# Patient Record
Sex: FEMALE | Race: WHITE | NOT HISPANIC OR LATINO | Employment: FULL TIME | URBAN - METROPOLITAN AREA
[De-identification: names, ages, dates, MRNs, and addresses within clinical notes are randomized per-mention and may not be internally consistent; named-entity substitution may affect disease eponyms.]

---

## 2024-07-10 ENCOUNTER — ULTRASOUND (OUTPATIENT)
Dept: OBGYN CLINIC | Facility: CLINIC | Age: 29
End: 2024-07-10
Payer: COMMERCIAL

## 2024-07-10 VITALS — DIASTOLIC BLOOD PRESSURE: 66 MMHG | WEIGHT: 127 LBS | SYSTOLIC BLOOD PRESSURE: 100 MMHG

## 2024-07-10 DIAGNOSIS — Z34.90 EARLY STAGE OF PREGNANCY: Primary | ICD-10-CM

## 2024-07-10 DIAGNOSIS — N91.2 AMENORRHEA: ICD-10-CM

## 2024-07-10 PROCEDURE — 76817 TRANSVAGINAL US OBSTETRIC: CPT | Performed by: STUDENT IN AN ORGANIZED HEALTH CARE EDUCATION/TRAINING PROGRAM

## 2024-07-10 PROCEDURE — 99203 OFFICE O/P NEW LOW 30 MIN: CPT | Performed by: STUDENT IN AN ORGANIZED HEALTH CARE EDUCATION/TRAINING PROGRAM

## 2024-07-10 NOTE — PROGRESS NOTES
Ambulatory Visit  Name: Deborah Way      : 1995      MRN: [id number]  Encounter Provider: Sangeetha Merchant MD  Encounter Date: 7/10/2024   Encounter department: [facility] [place]    Assessment & Plan   1. Early stage of pregnancy  -     Ambulatory Referral to Maternal Fetal Medicine; Future; Expected date: 2024  2. Amenorrhea  -     AMB US OB < 14 weeks single or first gestation level 1    Early pregnancy at 8 weeks 6 days with a calculated ERVIN of 2025 based on LMP c/w early ultrasound    - Genetic screening options reviewed. She is interested in NIPT, so MFM referral placed  - Prenatal care reviewed  - Pregnancy precautions reviewed    RTO for OB interview and PN-1 visit      History of Present Illness     Deborah Way is a 29 y.o. female who presents today for verification of pregnancy.  female, Patient's last menstrual period was 2024 (exact date). Menses are regular.This pregnancy was unplanned. She is accompanied by her  and child today.     OBHx is significant for FT  with baby in NICU for <48 hours for aspiration.  Taking a prenatal vitamin.     Review of Systems no vaginal bleeding      Objective     /66 (BP Location: Left arm, Patient Position: Sitting, Cuff Size: Standard)   Wt 57.6 kg (127 lb)   LMP 2024 (Exact Date)     Physical Exam  Vitals and nursing note reviewed.   Constitutional:       General: She is not in acute distress.     Appearance: She is well-developed.   HENT:      Head: Normocephalic and atraumatic.   Cardiovascular:      Rate and Rhythm: Normal rate.   Pulmonary:      Effort: Pulmonary effort is normal. No respiratory distress.   Genitourinary:     General: Normal vulva.      Cervix: No cervical motion tenderness or cervical bleeding.   Skin:     General: Skin is warm and dry.      Capillary Refill: Capillary refill takes less than 2 seconds.   Neurological:      Mental Status:  She is alert.   Psychiatric:         Mood and Affect: Mood normal.       Administrative Statements

## 2024-07-10 NOTE — PROGRESS NOTES
Pt is here for early ultrasound   No concerns at this time      LMP 24  Regular Cycles   8 weeks 6 days   ERVIN 25  [place] Pregnancy   No Vaginal Bleeding   No Nausea or Vomiting  Blood Type A +      Ultrasound Probe Disinfection    A transvaginal ultrasound was performed.   Prior to use, disinfection was performed with High Level Disinfection Process ([employer])  Probe serial number [id number]:  [id number] was used    LANE LAM  07/10/24  10:38 AM

## 2024-07-11 ENCOUNTER — TELEPHONE (OUTPATIENT)
Age: 29
End: 2024-07-11

## 2024-07-12 ENCOUNTER — TELEPHONE (OUTPATIENT)
Age: 29
End: 2024-07-12

## 2024-07-18 ENCOUNTER — OB ABSTRACT (OUTPATIENT)
Dept: OBGYN CLINIC | Facility: CLINIC | Age: 29
End: 2024-07-18

## 2024-07-22 ENCOUNTER — NURSE TRIAGE (OUTPATIENT)
Age: 29
End: 2024-07-22

## 2024-07-22 NOTE — TELEPHONE ENCOUNTER
"Pt reports fever, chills, cough since Thursday. All symptoms have resolved but pt reports that she has had constant chest pain since then. Pt currently having chest pain now 6/10. Pt denies SOB, nausea, vomiting, sweats, jaw pain, neck pain. She is currently 10 wks pregnant. Pain mostly located in the center of the chest and constant. Advised pt with constant chest pain this long it is best to get evaluated in the ED to rule out anything cardiac or PE. Pt is hesitant to go to ED as she has a large copay. Advised pt again of the concerns/danger or delaying evaluation. Pt states she will discuss with her .       Reason for Disposition   Chest pain lasting longer than 5 minutes and occurred in last 3 days (72 hours) (Exception: feels exactly the same as previously diagnosed heartburn and has accompanying sour taste in mouth)    Answer Assessment - Initial Assessment Questions  1. LOCATION: \"Where does it hurt?\"        Center chest area  2. RADIATION: \"Does the pain go anywhere else?\" (e.g., into neck, jaw, arms, back)      Denies  3. ONSET: \"When did the chest pain begin?\" (Minutes, hours or days)       Thursday   4. PATTERN \"Does the pain come and go, or has it been constant since it started?\"  \"Does it get worse with exertion?\"       Constant  5. DURATION: \"How long does it last\" (e.g., seconds, minutes, hours)      Constant  6. SEVERITY: \"How bad is the pain?\"  (e.g., Scale 1-10; mild, moderate, or severe)     - MILD (1-3): doesn't interfere with normal activities      - MODERATE (4-7): interferes with normal activities or awakens from sleep     - SEVERE (8-10): excruciating pain, unable to do any normal activities        6/1-  7. CARDIAC RISK FACTORS: \"Do you have any history of heart problems or risk factors for heart disease?\" (e.g., angina, prior heart attack; diabetes, high blood pressure, high cholesterol, smoker, or strong family history of heart disease)      Denies  8. PULMONARY RISK FACTORS: \"Do you " "have any history of lung disease?\"  (e.g., blood clots in lung, asthma, emphysema, birth control pills)      Denies  9. CAUSE: \"What do you think is causing the chest pain?\"      Unsure  10. OTHER SYMPTOMS: \"Do you have any other symptoms?\" (e.g., dizziness, nausea, vomiting, sweating, fever, difficulty breathing, cough)        Denies  11. PREGNANCY: \"Is there any chance you are pregnant?\" \"When was your last menstrual period?\"        10 weeks pregnant    Protocols used: Chest Pain-ADULT-[place]    "

## 2024-07-24 ENCOUNTER — OB ABSTRACT (OUTPATIENT)
Dept: OBGYN CLINIC | Facility: CLINIC | Age: 29
End: 2024-07-24

## 2024-07-24 NOTE — PATIENT INSTRUCTIONS
.Congratulations!! Please review our Pregnancy Essential Guide and [facility] L&D Virtual tour from our networks website.     [facility] Pregnancy Essentials Guide  [facility] ([email or web address])     [facility] ([facility])

## 2024-07-26 ENCOUNTER — INITIAL PRENATAL (OUTPATIENT)
Dept: OBGYN CLINIC | Facility: CLINIC | Age: 29
End: 2024-07-26

## 2024-07-26 VITALS — WEIGHT: 127 LBS | HEIGHT: 64 IN | BODY MASS INDEX: 21.68 KG/M2

## 2024-07-26 DIAGNOSIS — Z36.9 UNSPECIFIED ANTENATAL SCREENING: Primary | ICD-10-CM

## 2024-07-26 DIAGNOSIS — Z34.01 ENCOUNTER FOR SUPERVISION OF NORMAL FIRST PREGNANCY IN FIRST TRIMESTER: ICD-10-CM

## 2024-07-26 DIAGNOSIS — Z31.430 ENCOUNTER OF FEMALE FOR TESTING FOR GENETIC DISEASE CARRIER STATUS FOR PROCREATIVE MANAGEMENT: ICD-10-CM

## 2024-07-26 PROCEDURE — OBC

## 2024-07-26 RX ORDER — LANOLIN ALCOHOL/MO/W.PET/CERES
1 CREAM (GRAM) TOPICAL 2 TIMES DAILY
COMMUNITY

## 2024-07-26 NOTE — PROGRESS NOTES
.  OB INTAKE INTERVIEW  Patient is 29 y.o. who presents for OB intake at 11.1wks  She is accompanied by Spouse during this encounter  The father of her baby (Yonny) is involved in the pregnancy and is 34 years old.      Last Menstrual Period: 2024  Ultrasound: Measured 8 weeks 6 days on 07/10/2024  Estimated Date of Delivery: 2025 confirmed  by 8.6 week US    Signs/Symptoms of Pregnancy  Current pregnancy symptoms: fatigue  Constipation no  Headaches no  Cramping/spotting no  PICA cravings no    Diabetes-  Body mass index is 21.8 kg/m².  If patient has 1 or more, please order early 1 hour GTT  History of GDM no  BMI >35 no  History of PCOS or current metformin use no  History of LGA/macrosomic infant (4000g/9lbs) no    If patient has 2 or more, please order early 1 hour GTT  BMI>30 no  AMA no  First degree relative with type 2 diabetes no  History of chronic HTN, hyperlipidemia, elevated A1C no  High risk race (, , ,  or ) no    Hypertension- if you answer yes to any of the following, please order baseline preeclampsia labs (cbc, comprehensive metabolic panel, urine protein creatinine ratio, uric acid)  History of of chronic HTN no  History of gestational HTN no  History of preeclampsia, eclampsia, or HELLP syndrome no  History of diabetes no  History of lupus, autoimmune disease, kidney disease no    Thyroid- if yes order TSH with reflex T4  History of thyroid disease no    Bleeding Disorder or Hx of DVT-patient or first degree relative with history of. Order the following if not done previously.   (Factor V, antithrombin III, prothrombin gene mutation, protein C and S Ag, lupus anticoagulant, anticardiolipin, beta-2 glycoprotein)   no    OB/GYN-  History of abnormal pap smear no       Date of last pap smear   History of HPV no  History of Herpes/HSV no  History of other STI (gonorrhea, chlamydia, trich) no  History of prior   YES  History of prior  no  History of  delivery prior to 36 weeks 6 days no  History of blood transfusion no  Ok for blood transfusion yes    Substance screening-   History of tobacco use no  Currently using tobacco no  Substance Use Screen Level low    MRSA Screening-   Does the pt have a hx of MRSA? no    Immunizations:  Influenza vaccine given this season no  Discussed Tdap vaccine yes  Discussed COVID Vaccine yes    Genetic/M-  Do you or your partner have a history of any of the following in yourselves or first degree relatives?  Cystic fibrosis no  Spinal muscular atrophy no  Hemoglobinopathy/Sickle Cell/Thalassemia no  Fragile X Intellectual Disability no        discussed Carrier Screening being completed once in a lifetime as a standard of care lab test. Lab work ordered    Appointment for Nuchal Translucency Ultrasound at [facility] scheduled for 2024      Interview education  [facility] Pregnancy Essentials Book reviewed, discussed and attached to their AVS yes    Nurse/Family Partnership- patient may qualify no; referral placed no    Prenatal lab work scripts yes  Extra labs ordered:  HGB Electrophoresis    Aspirin/Preeclampsia Screen    Risk Level Risk Factor Recommendation   LOW Prior Uncomplicated full-term delivery YES No Aspirin recommendation        MODERATE Nulliparity no Recommend low-dose aspirin if     BMI>30 no 2 or more moderate risk factors    Family History Preeclampsia (mother/sister) no     35yr old or greater no     Black Race, Concern for SDOH/Low Socioeconomic no     IVF Pregnancy  no     Personal History Risks (low birth weight, prior adverse preg outcome, >10yr preg interval) no         HIGH History of Preeclampsia no Recommend low-dose aspirin if     Multifetal gestation no 1 or more high risk factors    Chronic HTN no     Type 1 or 2 Diabetes no     Renal Disease no     Autoimmune Disease  no      Contraindications to ASA therapy:  NSAID/ ASA allergy: no  Nasal  polyps: no  Asthma with history of ASA induced bronchospasm: no  Relative contraindications:  History of GI bleed: no  Active peptic ulcer disease: no  Severe hepatic dysfunction: no    Patient should be recommended to take ASA 162mg during this pregnancy from 12-36wks to lower her risk of preeclampsia: NONE      The patient has a history now or in prior pregnancy notable for:  Patient is currently breast feeding       Details that I feel the provider should be aware of: patient moved her from [place], has a 7 month old daughter    PN1 visit scheduled. The patient was oriented to our practice, the navigator role, reviewed delivering physicians and [facility] for Delivery. All questions were answered.    Interviewed by: Felicitas Macias Lpn, Ob Nurse Navigator

## 2024-08-07 ENCOUNTER — APPOINTMENT (OUTPATIENT)
Dept: LAB | Facility: CLINIC | Age: 29
End: 2024-08-07
Payer: COMMERCIAL

## 2024-08-07 DIAGNOSIS — Z31.430 ENCOUNTER OF FEMALE FOR TESTING FOR GENETIC DISEASE CARRIER STATUS FOR PROCREATIVE MANAGEMENT: ICD-10-CM

## 2024-08-07 DIAGNOSIS — Z36.9 UNSPECIFIED ANTENATAL SCREENING: ICD-10-CM

## 2024-08-07 DIAGNOSIS — Z34.01 ENCOUNTER FOR SUPERVISION OF NORMAL FIRST PREGNANCY IN FIRST TRIMESTER: ICD-10-CM

## 2024-08-07 LAB
ABO GROUP BLD: NORMAL
BACTERIA UR QL AUTO: ABNORMAL /HPF
BASOPHILS # BLD AUTO: 0.03 THOUSANDS/ÂΜL (ref 0–0.1)
BASOPHILS NFR BLD AUTO: 1 % (ref 0–1)
BILIRUB UR QL STRIP: NEGATIVE
BLD GP AB SCN SERPL QL: NEGATIVE
CLARITY UR: ABNORMAL
COLOR UR: ABNORMAL
EOSINOPHIL # BLD AUTO: 0.09 THOUSAND/ÂΜL (ref 0–0.61)
EOSINOPHIL NFR BLD AUTO: 2 % (ref 0–6)
ERYTHROCYTE [DISTWIDTH] IN BLOOD BY AUTOMATED COUNT: 13.1 % (ref 11.6–15.1)
GLUCOSE UR STRIP-MCNC: NEGATIVE MG/DL
HCT VFR BLD AUTO: 39.8 % (ref 34.8–46.1)
HGB BLD-MCNC: 13.3 G/DL (ref 11.5–15.4)
HGB UR QL STRIP.AUTO: NEGATIVE
IMM GRANULOCYTES # BLD AUTO: 0.02 THOUSAND/UL (ref 0–0.2)
IMM GRANULOCYTES NFR BLD AUTO: 0 % (ref 0–2)
KETONES UR STRIP-MCNC: NEGATIVE MG/DL
LEUKOCYTE ESTERASE UR QL STRIP: ABNORMAL
LYMPHOCYTES # BLD AUTO: 1.5 THOUSANDS/ÂΜL (ref 0.6–4.47)
LYMPHOCYTES NFR BLD AUTO: 25 % (ref 14–44)
MCH RBC QN AUTO: 30.2 PG (ref 26.8–34.3)
MCHC RBC AUTO-ENTMCNC: 33.4 G/DL (ref 31.4–37.4)
MCV RBC AUTO: 91 FL (ref 82–98)
MONOCYTES # BLD AUTO: 0.84 THOUSAND/ÂΜL (ref 0.17–1.22)
MONOCYTES NFR BLD AUTO: 14 % (ref 4–12)
MUCOUS THREADS UR QL AUTO: ABNORMAL
NEUTROPHILS # BLD AUTO: 3.52 THOUSANDS/ÂΜL (ref 1.85–7.62)
NEUTS SEG NFR BLD AUTO: 58 % (ref 43–75)
NITRITE UR QL STRIP: POSITIVE
NON-SQ EPI CELLS URNS QL MICRO: ABNORMAL /HPF
NRBC BLD AUTO-RTO: 0 /100 WBCS
PH UR STRIP.AUTO: 7 [PH]
PLATELET # BLD AUTO: 339 THOUSANDS/UL (ref 149–390)
PMV BLD AUTO: 10.2 FL (ref 8.9–12.7)
PROT UR STRIP-MCNC: NEGATIVE MG/DL
RBC # BLD AUTO: 4.4 MILLION/UL (ref 3.81–5.12)
RBC #/AREA URNS AUTO: ABNORMAL /HPF
RH BLD: POSITIVE
RUBV IGG SERPL IA-ACNC: 315.5 IU/ML
SP GR UR STRIP.AUTO: 1.01 (ref 1–1.03)
SPECIMEN EXPIRATION DATE: NORMAL
UROBILINOGEN UR STRIP-ACNC: <2 MG/DL
WBC # BLD AUTO: 6 THOUSAND/UL (ref 4.31–10.16)
WBC #/AREA URNS AUTO: ABNORMAL /HPF

## 2024-08-07 PROCEDURE — 87186 SC STD MICRODIL/AGAR DIL: CPT

## 2024-08-07 PROCEDURE — 87340 HEPATITIS B SURFACE AG IA: CPT

## 2024-08-07 PROCEDURE — 86762 RUBELLA ANTIBODY: CPT

## 2024-08-07 PROCEDURE — 86850 RBC ANTIBODY SCREEN: CPT

## 2024-08-07 PROCEDURE — 86706 HEP B SURFACE ANTIBODY: CPT

## 2024-08-07 PROCEDURE — 81001 URINALYSIS AUTO W/SCOPE: CPT

## 2024-08-07 PROCEDURE — 86901 BLOOD TYPING SEROLOGIC RH(D): CPT

## 2024-08-07 PROCEDURE — 86900 BLOOD TYPING SEROLOGIC ABO: CPT

## 2024-08-07 PROCEDURE — 86803 HEPATITIS C AB TEST: CPT

## 2024-08-07 PROCEDURE — 36415 COLL VENOUS BLD VENIPUNCTURE: CPT

## 2024-08-07 PROCEDURE — 85025 COMPLETE CBC W/AUTO DIFF WBC: CPT

## 2024-08-07 PROCEDURE — 87389 HIV-1 AG W/HIV-1&-2 AB AG IA: CPT

## 2024-08-07 PROCEDURE — 87077 CULTURE AEROBIC IDENTIFY: CPT

## 2024-08-07 PROCEDURE — 87086 URINE CULTURE/COLONY COUNT: CPT

## 2024-08-07 PROCEDURE — 83020 HEMOGLOBIN ELECTROPHORESIS: CPT

## 2024-08-07 PROCEDURE — 86780 TREPONEMA PALLIDUM: CPT

## 2024-08-08 DIAGNOSIS — O23.40 URINARY TRACT INFECTION IN MOTHER DURING PREGNANCY, ANTEPARTUM: Primary | ICD-10-CM

## 2024-08-08 LAB
HBV SURFACE AB SER-ACNC: 28.6 MIU/ML
HBV SURFACE AG SER QL: NORMAL
HCV AB SER QL: NORMAL
HIV 1+2 AB+HIV1 P24 AG SERPL QL IA: NORMAL
HIV 2 AB SERPL QL IA: NORMAL
HIV1 AB SERPL QL IA: NORMAL
HIV1 P24 AG SERPL QL IA: NORMAL
TREPONEMA PALLIDUM IGG+IGM AB [PRESENCE] IN SERUM OR PLASMA BY IMMUNOASSAY: NORMAL

## 2024-08-08 RX ORDER — NITROFURANTOIN 25; 75 MG/1; MG/1
100 CAPSULE ORAL 2 TIMES DAILY
Qty: 10 CAPSULE | Refills: 0 | Status: CANCELLED | OUTPATIENT
Start: 2024-08-08 | End: 2024-08-13

## 2024-08-09 ENCOUNTER — ROUTINE PRENATAL (OUTPATIENT)
Facility: HOSPITAL | Age: 29
End: 2024-08-09
Attending: STUDENT IN AN ORGANIZED HEALTH CARE EDUCATION/TRAINING PROGRAM
Payer: COMMERCIAL

## 2024-08-09 VITALS
WEIGHT: 124.56 LBS | SYSTOLIC BLOOD PRESSURE: 130 MMHG | DIASTOLIC BLOOD PRESSURE: 66 MMHG | BODY MASS INDEX: 21.27 KG/M2 | HEART RATE: 71 BPM | HEIGHT: 64 IN

## 2024-08-09 DIAGNOSIS — Z36.82 NUCHAL TRANSLUCENCY OF FETUS ON PRENATAL ULTRASOUND: ICD-10-CM

## 2024-08-09 DIAGNOSIS — Z34.90 EARLY STAGE OF PREGNANCY: ICD-10-CM

## 2024-08-09 DIAGNOSIS — Z36.0 ENCOUNTER FOR ANTENATAL SCREENING FOR CHROMOSOMAL ANOMALIES: Primary | ICD-10-CM

## 2024-08-09 DIAGNOSIS — Z3A.13 13 WEEKS GESTATION OF PREGNANCY: ICD-10-CM

## 2024-08-09 LAB
BACTERIA UR CULT: ABNORMAL
HGB A MFR BLD: 2.5 % (ref 1.8–3.2)
HGB A MFR BLD: 97.5 % (ref 96.4–98.8)
HGB F MFR BLD: 0 % (ref 0–2)
HGB FRACT BLD-IMP: NORMAL
HGB S MFR BLD: 0 %

## 2024-08-09 PROCEDURE — 76813 OB US NUCHAL MEAS 1 GEST: CPT | Performed by: OBSTETRICS & GYNECOLOGY

## 2024-08-09 PROCEDURE — 76801 OB US < 14 WKS SINGLE FETUS: CPT | Performed by: OBSTETRICS & GYNECOLOGY

## 2024-08-09 PROCEDURE — 36415 COLL VENOUS BLD VENIPUNCTURE: CPT | Performed by: OBSTETRICS & GYNECOLOGY

## 2024-08-09 PROCEDURE — 99243 OFF/OP CNSLTJ NEW/EST LOW 30: CPT | Performed by: OBSTETRICS & GYNECOLOGY

## 2024-08-09 NOTE — LETTER
August 12, 2024     Sangeetha Merchant MD  [address]  [address]  [place] [address]    Patient: Deborah Way   YOB: 1995   Date of Visit: 8/9/2024       Dear Dr. Merchant:    Thank you for referring Deborah Way to me for evaluation. Below are my notes for this consultation.    If you have questions, please do not hesitate to call me. I look forward to following your patient along with you.         Sincerely,        Helio Rowe MD        CC: No Recipients    Helio Rowe MD  8/12/2024  1:33 PM  Sign when Signing Visit  A fetal ultrasound was completed. See Ob procedures in Epic for an interpretation and recommendations. Do not hesitate to contact us in M if you have questions.    Helio Rowe MD, MSCE  Maternal Fetal Medicine      Juliana Reyes  8/12/2024  1:30 PM  Sign when Signing Visit  Patient chose to have [facility] [id number] Non-Invasive Prenatal Screen [id number] PLUS w/ SCA, WITH fetal sex.  Patient choose to be billed through insurance.     Patient given brochure and is aware [facility] will contact patient's insurance and coordinate coverage.  Provided [facility] contact information. General inquiries [phone], Cost estimates [phone] and [facility] Billing [phone]. Website [email or web address].     Blood collection tubes labeled with patient identifiers (name, medical record number, and date of birth).     Filled out [facility] order form. Patient chose to have blood drawn in our office at time of visit. NIPS was drawn from left arm with a butterfly needle by Monae WALTON MA.       If patient chose to have blood work drawn at a [facility] lab we requested patient notify MFM (via phone call or [employer] message) when blood collected so office can follow up on results.       Maternal Fetal Medicine will have results in approximately 5-7 business days and will call patient or notify via [employer].  Patient aware viewing lab result online will reveal fetal sex if  ordered.    Patient verbalized understanding of all instructions and no questions at this time.

## 2024-08-12 NOTE — PROGRESS NOTES
Patient chose to have [facility] [id number] Non-Invasive Prenatal Screen [id number] PLUS w/ SCA, WITH fetal sex.  Patient choose to be billed through insurance.     Patient given brochure and is aware [facility] will contact patient's insurance and coordinate coverage.  Provided [facility] contact information. General inquiries [phone], Cost estimates [phone] and [facility] Billing [phone]. Website [email or web address].     Blood collection tubes labeled with patient identifiers (name, medical record number, and date of birth).     Filled out [facility] order form. Patient chose to have blood drawn in our office at time of visit. NIPS was drawn from left arm with a butterfly needle by Monae WALTON MA.       If patient chose to have blood work drawn at a [facility] lab we requested patient notify MFM (via phone call or [employer] message) when blood collected so office can follow up on results.       Maternal Fetal Medicine will have results in approximately 5-7 business days and will call patient or notify via [employer].  Patient aware viewing lab result online will reveal fetal sex if ordered.    Patient verbalized understanding of all instructions and no questions at this time.

## 2024-08-12 NOTE — PROGRESS NOTES
A fetal ultrasound was completed. See Ob procedures in Epic for an interpretation and recommendations. Do not hesitate to contact us in [facility] if you have questions.    Helio Rowe MD, MSCE  Maternal Fetal Medicine

## 2024-08-13 LAB
CFDNA.FET/CFDNA.TOTAL SFR FETUS: NORMAL %
CITATION REF LAB TEST: NORMAL
FET 13+18+21+X+Y ANEUP PLAS.CFDNA: NEGATIVE
FET CHR 21 TS PLAS.CFDNA QL: NEGATIVE
FET CHR 21 TS PLAS.CFDNA QL: NEGATIVE
FET MS X RISK WBC.DNA+CFDNA QL: NOT DETECTED
FET SEX PLAS.CFDNA DOSAGE CFDNA: NORMAL
FET TS 13 RISK PLAS.CFDNA QL: NEGATIVE
FET X + Y ANEUP RISK PLAS.CFDNA SEQ-IMP: NOT DETECTED
GA EST FROM CONCEPTION DATE: NORMAL D
GESTATIONAL AGE > 9:: YES
LAB DIRECTOR NAME PROVIDER: NORMAL
LAB DIRECTOR NAME PROVIDER: NORMAL
LABORATORY COMMENT REPORT: NORMAL
LIMITATIONS OF THE TEST: NORMAL
NEGATIVE PREDICTIVE VALUE: NORMAL
PERFORMANCE CHARACTERISTICS: NORMAL
POSITIVE PREDICTIVE VALUE: NORMAL
REF LAB TEST METHOD: NORMAL
SL AMB NOTE:: NORMAL
TEST PERFORMANCE INFO SPEC: NORMAL

## 2024-08-14 NOTE — RESULT ENCOUNTER NOTE
I have reviewed the patient's lab results which are normal.  Please contact the patient to inform her of the normal results, unless Ms. Way has reviewed the results using [employer]    Helio Rowe

## 2024-08-20 ENCOUNTER — TELEPHONE (OUTPATIENT)
Age: 29
End: 2024-08-20

## 2024-08-20 NOTE — TELEPHONE ENCOUNTER
Incoming call from patient. States she received call regarding results. Genetic testing results and urine results reviewed with patient. Per result note, unable to send antibiotic as no local pharmacy on file. Pharmacy added to chart.     Routing to provider for prescription.

## 2024-08-21 DIAGNOSIS — O23.42 URINARY TRACT INFECTION IN MOTHER DURING SECOND TRIMESTER OF PREGNANCY: Primary | ICD-10-CM

## 2024-08-21 RX ORDER — NITROFURANTOIN 25; 75 MG/1; MG/1
100 CAPSULE ORAL 2 TIMES DAILY
Qty: 10 CAPSULE | Refills: 0 | Status: SHIPPED | OUTPATIENT
Start: 2024-08-21 | End: 2024-08-26

## 2024-08-21 NOTE — TELEPHONE ENCOUNTER
Left message making patient aware Rx sent to pharmacy and need to complete full course. Offered call back for questions or concerns.

## 2024-09-06 ENCOUNTER — TELEPHONE (OUTPATIENT)
Dept: OBGYN CLINIC | Facility: CLINIC | Age: 29
End: 2024-09-06

## 2024-09-27 ENCOUNTER — ROUTINE PRENATAL (OUTPATIENT)
Dept: PERINATAL CARE | Facility: OTHER | Age: 29
End: 2024-09-27
Payer: COMMERCIAL

## 2024-09-27 ENCOUNTER — TELEPHONE (OUTPATIENT)
Facility: HOSPITAL | Age: 29
End: 2024-09-27

## 2024-09-27 VITALS
DIASTOLIC BLOOD PRESSURE: 74 MMHG | BODY MASS INDEX: 21.68 KG/M2 | WEIGHT: 127 LBS | HEIGHT: 64 IN | HEART RATE: 99 BPM | SYSTOLIC BLOOD PRESSURE: 108 MMHG

## 2024-09-27 DIAGNOSIS — Z36.3 ENCOUNTER FOR ANTENATAL SCREENING FOR MALFORMATIONS: Primary | ICD-10-CM

## 2024-09-27 DIAGNOSIS — O44.40 LOW-LYING PLACENTA: ICD-10-CM

## 2024-09-27 DIAGNOSIS — O09.899 SHORT INTERVAL BETWEEN PREGNANCIES AFFECTING PREGNANCY, ANTEPARTUM: ICD-10-CM

## 2024-09-27 DIAGNOSIS — Z3A.20 20 WEEKS GESTATION OF PREGNANCY: ICD-10-CM

## 2024-09-27 DIAGNOSIS — Z36.86 ENCOUNTER FOR ANTENATAL SCREENING FOR CERVICAL LENGTH: ICD-10-CM

## 2024-09-27 PROCEDURE — 76817 TRANSVAGINAL US OBSTETRIC: CPT | Performed by: OBSTETRICS & GYNECOLOGY

## 2024-09-27 PROCEDURE — 76805 OB US >/= 14 WKS SNGL FETUS: CPT | Performed by: OBSTETRICS & GYNECOLOGY

## 2024-09-27 PROCEDURE — 99213 OFFICE O/P EST LOW 20 MIN: CPT | Performed by: OBSTETRICS & GYNECOLOGY

## 2024-09-27 NOTE — PROGRESS NOTES
Ultrasound Probe Disinfection    A transvaginal ultrasound was performed.   Prior to use, disinfection was performed with High Level Disinfection Process ([employer]).  Probe serial number F1: [id number]  was used.    Chaperone: Jovanna Nuñez RDMS

## 2024-09-27 NOTE — TELEPHONE ENCOUNTER
Unable to reach patient by phone, left voicemail explaining our [facility] office has had a slight change in scheduling. We rescheduled her appointment to September 27 @ 10:15 a.m. at our [facility] located at [address] [place] [address]. If this is not convenient, please contact our office at [phone].    We do apologize for any inconvenience.

## 2024-09-27 NOTE — PROGRESS NOTES
"[facility]: Deborah Way was seen today for anatomic survey and cervical length screening ultrasound.  See ultrasound report under \"OB Procedures\" tab.   The time spent on this established patient on the encounter date included 5 minutes previsit service time reviewing records and precharting,  minutes face-to-face service time 9 counseling regarding results and coordinating care, and  6 minutes charting, totalling 16 minutes.  Please don't hesitate to contact our office with any concerns or questions.  -Cheryl Barnes MD      "

## 2024-10-11 ENCOUNTER — ROUTINE PRENATAL (OUTPATIENT)
Dept: OBGYN CLINIC | Facility: CLINIC | Age: 29
End: 2024-10-11

## 2024-10-11 VITALS
DIASTOLIC BLOOD PRESSURE: 72 MMHG | HEIGHT: 64 IN | WEIGHT: 130.4 LBS | SYSTOLIC BLOOD PRESSURE: 118 MMHG | BODY MASS INDEX: 22.26 KG/M2

## 2024-10-11 DIAGNOSIS — O09.899 SHORT INTERVAL BETWEEN PREGNANCIES AFFECTING PREGNANCY, ANTEPARTUM: ICD-10-CM

## 2024-10-11 DIAGNOSIS — O44.40 LOW-LYING PLACENTA: ICD-10-CM

## 2024-10-11 DIAGNOSIS — Z34.92 SUPERVISION OF LOW-RISK PREGNANCY, SECOND TRIMESTER: ICD-10-CM

## 2024-10-11 DIAGNOSIS — O23.42 URINARY TRACT INFECTION IN MOTHER DURING SECOND TRIMESTER OF PREGNANCY: Primary | ICD-10-CM

## 2024-10-11 DIAGNOSIS — Z3A.22 22 WEEKS GESTATION OF PREGNANCY: ICD-10-CM

## 2024-10-11 PROCEDURE — 87086 URINE CULTURE/COLONY COUNT: CPT | Performed by: OBSTETRICS & GYNECOLOGY

## 2024-10-11 PROCEDURE — PNV: Performed by: OBSTETRICS & GYNECOLOGY

## 2024-10-11 NOTE — ASSESSMENT & PLAN NOTE
Reports h/o pyelonephritis in previous pregnancy   Had UTI, s/p tx with macrobid, no FANNY sent - obtained today

## 2024-10-11 NOTE — PROGRESS NOTES
"    S: 29 y.o.  22w1d here for routine prenatal visit.        Chief Complaint   Patient presents with    Routine Prenatal Visit     [facility] care, 22 weeks[place] [facility] [place]         OB complaints:  Contractions: no  Leakage: no  Bleeding: no  Fetal movement: yes        O:  /72 (BP Location: Right arm, Patient Position: Sitting, Cuff Size: Adult)   Ht 5' 4\" (1.626 m)   Wt 59.1 kg (130 lb 6.4 oz)   LMP 2024 (Exact Date)   BMI 22.38 kg/m²       Review of Systems   Constitutional:  Negative for chills and fever.   Eyes:  Negative for visual disturbance.   Respiratory:  Negative for chest tightness and shortness of breath.    Cardiovascular:  Negative for chest pain.   Gastrointestinal:  Negative for abdominal pain, diarrhea, nausea and vomiting.   Genitourinary:  Negative for pelvic pain and vaginal bleeding.        As noted in HPI   Skin:  Negative for rash.   Neurological:  Negative for headaches.   All other systems reviewed and are negative.        Physical Exam  Constitutional:       General: She is not in acute distress.     Appearance: Normal appearance.   HENT:      Head: Normocephalic and atraumatic.   Cardiovascular:      Rate and Rhythm: Normal rate.   Pulmonary:      Effort: Pulmonary effort is normal. No respiratory distress.   Abdominal:      General: There is no distension.      Palpations: Abdomen is soft.      Tenderness: There is no abdominal tenderness. There is no guarding or rebound.      Comments: gravid   Neurological:      General: No focal deficit present.      Mental Status: She is alert.   Psychiatric:         Mood and Affect: Mood normal.         Behavior: Behavior normal.   Vitals and nursing note reviewed.           Fundal Height (cm): 22 cm  Fetal Heart Rate: 140    Pregravid Weight/BMI: 56.7 kg (125 lb) (BMI 21.45)  Current Weight: 59.1 kg (130 lb 6.4 oz)   Total Weight Gain: 2.449 kg (5 lb 6.4 oz)     Pre- Vitals      Flowsheet Row Most Recent Value "   Prenatal Assessment    Fetal Heart Rate 140   Fundal Height (cm) 22 cm   Movement Present   Prenatal Vitals    Blood Pressure 118/72   Weight - Scale 59.1 kg (130 lb 6.4 oz)   Urine Albumin/Glucose    Dilation/Effacement/Station    Vaginal Drainage    Edema                     Problem List       Short interval between pregnancies affecting pregnancy, antepartum    Low-lying placenta    Urinary tract infection in mother during second trimester of pregnancy    Current Assessment & Plan     Reports h/o pyelonephritis in previous pregnancy   Had UTI, s/p tx with macrobid, no FANNY sent - obtained today          22 weeks gestation of pregnancy    Supervision of low-risk pregnancy, second trimester    Overview     NIPT low risk. msAFP not done  Declines flu vaccine          Current Assessment & Plan     [place] [facility] [place] and intention to deliver at [place]  Reviewed benefits of influenza vaccination in pregnancy including but not limited to reduction in maternal influenza hospitalization, reduction in risk of stillbirth, and reduction in influenza-related morbidity and mortality among infants. Reviewed safety of influenza vaccine in pregnancy and overall very low risk of reaction or adverse effects. Patient voiced understanding of all this and DECLINES vaccination today.   Growth US scheduled  OB precautions reviewed                                Cheri Gamble MD  10/11/2024  3:29 PM

## 2024-10-11 NOTE — ASSESSMENT & PLAN NOTE
[place] [facility] [place] and intention to deliver at [place]  Reviewed benefits of influenza vaccination in pregnancy including but not limited to reduction in maternal influenza hospitalization, reduction in risk of stillbirth, and reduction in influenza-related morbidity and mortality among infants. Reviewed safety of influenza vaccine in pregnancy and overall very low risk of reaction or adverse effects. Patient voiced understanding of all this and DECLINES vaccination today.   Growth US scheduled  OB precautions reviewed

## 2024-10-12 LAB — BACTERIA UR CULT: NORMAL

## 2024-11-08 ENCOUNTER — ROUTINE PRENATAL (OUTPATIENT)
Dept: OBGYN CLINIC | Facility: CLINIC | Age: 29
End: 2024-11-08

## 2024-11-08 VITALS
DIASTOLIC BLOOD PRESSURE: 62 MMHG | BODY MASS INDEX: 28.51 KG/M2 | SYSTOLIC BLOOD PRESSURE: 112 MMHG | WEIGHT: 167 LBS | HEIGHT: 64 IN

## 2024-11-08 DIAGNOSIS — Z3A.22 22 WEEKS GESTATION OF PREGNANCY: ICD-10-CM

## 2024-11-08 DIAGNOSIS — O23.42 URINARY TRACT INFECTION IN MOTHER DURING SECOND TRIMESTER OF PREGNANCY: Primary | ICD-10-CM

## 2024-11-08 DIAGNOSIS — Z34.92 SUPERVISION OF LOW-RISK PREGNANCY, SECOND TRIMESTER: ICD-10-CM

## 2024-11-08 PROCEDURE — PNV: Performed by: OBSTETRICS & GYNECOLOGY

## 2024-11-08 NOTE — PROGRESS NOTES
"    S: 29 y.o.  26w1d here for routine prenatal visit.        Chief Complaint   Patient presents with    Routine Prenatal Visit     Pain and lump on left hand side of the pelvic region near the vagina. Feels like a bruise is there.         OB complaints:  Contractions: no  Leakage: no  Bleeding: no  Fetal movement: yes      O:  /62 (BP Location: Right arm, Patient Position: Sitting, Cuff Size: Standard)   Ht 5' 4\" (1.626 m)   Wt 75.8 kg (167 lb)   LMP 2024 (Exact Date)   BMI 28.67 kg/m²       Review of Systems   Constitutional:  Negative for chills and fever.   Eyes:  Negative for visual disturbance.   Respiratory:  Negative for chest tightness and shortness of breath.    Cardiovascular:  Negative for chest pain.   Gastrointestinal:  Negative for abdominal pain, diarrhea, nausea and vomiting.   Genitourinary:  Negative for pelvic pain and vaginal bleeding.        As noted in HPI   Skin:  Negative for rash.   Neurological:  Negative for headaches.   All other systems reviewed and are negative.        Physical Exam  Constitutional:       General: She is not in acute distress.     Appearance: Normal appearance.   Genitourinary:      Genitourinary Comments: Left upper labia majora/mons with some tenderness to palpation without discrete mass, induration or erythema   HENT:      Head: Normocephalic and atraumatic.   Cardiovascular:      Rate and Rhythm: Normal rate.   Pulmonary:      Effort: Pulmonary effort is normal. No respiratory distress.   Abdominal:      General: There is no distension.      Palpations: Abdomen is soft.      Tenderness: There is no abdominal tenderness. There is no guarding or rebound.      Comments: gravid   Neurological:      General: No focal deficit present.      Mental Status: She is alert.   Psychiatric:         Mood and Affect: Mood normal.         Behavior: Behavior normal.   Vitals and nursing note reviewed.           Fundal Height (cm): 25 cm  Fetal Heart Rate: " 145    Pregravid Weight/BMI: 56.7 kg (125 lb) (BMI 21.45)  Current Weight: 75.8 kg (167 lb)   Total Weight Gain: 19.1 kg (42 lb)     Pre-Dieudonne Vitals      Flowsheet Row Most Recent Value   Prenatal Assessment    Fetal Heart Rate 145   Fundal Height (cm) 25 cm   Movement Present   Prenatal Vitals    Blood Pressure 112/62   Weight - Scale 75.8 kg (167 lb)   Urine Albumin/Glucose    Dilation/Effacement/Station    Vaginal Drainage    Edema                     Problem List       Short interval between pregnancies affecting pregnancy, antepartum    Low-lying placenta    Urinary tract infection in mother during second trimester of pregnancy    Overview     First trimester urine culture >100k E coli, pansusceptible  - urine culture FANYN negative          22 weeks gestation of pregnancy    Supervision of low-risk pregnancy, second trimester    Overview     NIPT low risk. msAFP not done  Declines flu vaccine          Current Assessment & Plan     Area of tenderness/discomfort at upper left mons may be due to venous congestion, supportive care reviewed  Midtrimester labs ordered  OB precautions reviewed                               Cheri Gamble MD  2024  3:07 PM

## 2024-11-13 PROBLEM — O23.42 URINARY TRACT INFECTION IN MOTHER DURING SECOND TRIMESTER OF PREGNANCY: Status: RESOLVED | Noted: 2024-10-11 | Resolved: 2024-11-13

## 2024-11-14 ENCOUNTER — CLINICAL SUPPORT (OUTPATIENT)
Age: 29
End: 2024-11-14

## 2024-11-14 DIAGNOSIS — Z32.2 ENCOUNTER FOR CHILDBIRTH INSTRUCTION: Primary | ICD-10-CM

## 2024-11-19 ENCOUNTER — TELEPHONE (OUTPATIENT)
Dept: OBGYN CLINIC | Facility: CLINIC | Age: 29
End: 2024-11-19

## 2024-11-19 NOTE — TELEPHONE ENCOUNTER
Patient was called and left a voice message in regards to her appointment on 11/22 with Dr. Gamble. There was a change in Dr Gamble's schedule. Advised patient that we have openings on 11/21. Advised to call the office to schedule. Will send mychart message as well.

## 2024-11-21 ENCOUNTER — ROUTINE PRENATAL (OUTPATIENT)
Dept: OBGYN CLINIC | Facility: CLINIC | Age: 29
End: 2024-11-21
Payer: COMMERCIAL

## 2024-11-21 VITALS
WEIGHT: 134.8 LBS | DIASTOLIC BLOOD PRESSURE: 62 MMHG | SYSTOLIC BLOOD PRESSURE: 110 MMHG | BODY MASS INDEX: 23.01 KG/M2 | HEIGHT: 64 IN

## 2024-11-21 DIAGNOSIS — Z3A.28 28 WEEKS GESTATION OF PREGNANCY: ICD-10-CM

## 2024-11-21 DIAGNOSIS — Z23 ENCOUNTER FOR IMMUNIZATION: ICD-10-CM

## 2024-11-21 DIAGNOSIS — Z34.93 SUPERVISION OF LOW-RISK PREGNANCY, THIRD TRIMESTER: Primary | ICD-10-CM

## 2024-11-21 LAB
DME PARACHUTE DELIVERY DATE REQUESTED: NORMAL
DME PARACHUTE ITEM DESCRIPTION: NORMAL
DME PARACHUTE ORDER STATUS: NORMAL
DME PARACHUTE SUPPLIER NAME: NORMAL
DME PARACHUTE SUPPLIER PHONE: NORMAL

## 2024-11-21 PROCEDURE — 90715 TDAP VACCINE 7 YRS/> IM: CPT | Performed by: PHYSICIAN ASSISTANT

## 2024-11-21 PROCEDURE — 90471 IMMUNIZATION ADMIN: CPT | Performed by: PHYSICIAN ASSISTANT

## 2024-11-21 PROCEDURE — PNV: Performed by: PHYSICIAN ASSISTANT

## 2024-11-21 NOTE — PROGRESS NOTES
28w0d pregnant female, here for prenatal visit. Pt well, without complaints.   She is feeling fetal movement.  Fetal kick counts reviewed.    28 wk labs: NOT done. Blood type: A positive.      OB folder given and reviewed contents: fetal kick counting, perineal/ vaginal massage, [facility], consent for delivery,  birth plan guide, photography release, birth room support person form, birth certificate worksheet    Reviewed breastfeeding, pediatrician & classes.  Recommendation for Tdap vaccine in 3rd trimester reviewed.  Pt elects Tdap vaccine today.  Pediatrician: Dr Palma.  [facility]  Plans to breastfeed: yes.   Breastpump ordered.    Declines flu vaccine.      Current Outpatient Medications:     calcium citrate-vitamin D 315 mg-5 mcg tablet, Take 1 tablet by mouth 2 (two) times a day, Disp: , Rfl:     Prenatal MV-Min-Fe Fum-FA-DHA (PRENATAL 1 PO), Take by mouth, Disp: , Rfl:     Pregravid Weight/BMI: 56.7 kg (125 lb) (BMI 21.45)  Current Weight:     Total Weight Gain: 4.445 kg (9 lb 12.8 oz)          Vitals:    11/21/24 1414   BP: 110/62       Fundal height: 27  Fetal Heart Rate: 134      Problem List          Obstetrics/Gynecology    Short interval between pregnancies affecting pregnancy, antepartum    Low-lying placenta    28 weeks gestation of pregnancy    Supervision of low-risk pregnancy, third trimester    Overview   NIPT low risk. msAFP not done  Declines flu vaccine             Ob visit in 2 weeks  US scheduled 12/20/24  Tdap completed today  Ob consents given for patient to review and bring to next visit  Complete labs ASAP

## 2024-11-27 ENCOUNTER — APPOINTMENT (OUTPATIENT)
Dept: LAB | Facility: CLINIC | Age: 29
End: 2024-11-27
Payer: COMMERCIAL

## 2024-11-27 DIAGNOSIS — Z34.92 SUPERVISION OF LOW-RISK PREGNANCY, SECOND TRIMESTER: ICD-10-CM

## 2024-11-27 LAB
ERYTHROCYTE [DISTWIDTH] IN BLOOD BY AUTOMATED COUNT: 12.4 % (ref 11.6–15.1)
GLUCOSE 1H P 50 G GLC PO SERPL-MCNC: 76 MG/DL (ref 70–134)
HCT VFR BLD AUTO: 34.4 % (ref 34.8–46.1)
HGB BLD-MCNC: 11.8 G/DL (ref 11.5–15.4)
MCH RBC QN AUTO: 31 PG (ref 26.8–34.3)
MCHC RBC AUTO-ENTMCNC: 34.3 G/DL (ref 31.4–37.4)
MCV RBC AUTO: 90 FL (ref 82–98)
PLATELET # BLD AUTO: 295 THOUSANDS/UL (ref 149–390)
PMV BLD AUTO: 9.9 FL (ref 8.9–12.7)
RBC # BLD AUTO: 3.81 MILLION/UL (ref 3.81–5.12)
TREPONEMA PALLIDUM IGG+IGM AB [PRESENCE] IN SERUM OR PLASMA BY IMMUNOASSAY: NORMAL
WBC # BLD AUTO: 9.21 THOUSAND/UL (ref 4.31–10.16)

## 2024-11-27 PROCEDURE — 36415 COLL VENOUS BLD VENIPUNCTURE: CPT

## 2024-11-27 PROCEDURE — 86780 TREPONEMA PALLIDUM: CPT

## 2024-11-27 PROCEDURE — 82950 GLUCOSE TEST: CPT

## 2024-11-27 PROCEDURE — 85027 COMPLETE CBC AUTOMATED: CPT

## 2024-12-02 ENCOUNTER — RESULTS FOLLOW-UP (OUTPATIENT)
Dept: OBGYN CLINIC | Facility: CLINIC | Age: 29
End: 2024-12-02

## 2024-12-06 ENCOUNTER — ROUTINE PRENATAL (OUTPATIENT)
Dept: OBGYN CLINIC | Facility: CLINIC | Age: 29
End: 2024-12-06

## 2024-12-06 VITALS
WEIGHT: 138.4 LBS | BODY MASS INDEX: 23.63 KG/M2 | DIASTOLIC BLOOD PRESSURE: 64 MMHG | HEART RATE: 68 BPM | HEIGHT: 64 IN | OXYGEN SATURATION: 100 % | SYSTOLIC BLOOD PRESSURE: 116 MMHG

## 2024-12-06 DIAGNOSIS — Z34.93 SUPERVISION OF LOW-RISK PREGNANCY, THIRD TRIMESTER: ICD-10-CM

## 2024-12-06 DIAGNOSIS — O09.899 SHORT INTERVAL BETWEEN PREGNANCIES AFFECTING PREGNANCY, ANTEPARTUM: Primary | ICD-10-CM

## 2024-12-06 DIAGNOSIS — Z3A.30 30 WEEKS GESTATION OF PREGNANCY: ICD-10-CM

## 2024-12-06 PROCEDURE — PNV: Performed by: OBSTETRICS & GYNECOLOGY

## 2024-12-06 NOTE — PROGRESS NOTES
"    S: 29 y.o.  30w1d here for routine prenatal visit.        Chief Complaint   Patient presents with    Routine Prenatal Visit     30w1d. Lost a little of her mucus plug, not bloody, no contractions, and not discomfort. Lost it last night. Declines being checked.          OB complaints:  Contractions: no  Leakage: no. Had passage of mucus tissue but no leakage of fluid or persistent discharge afterwards   Bleeding: no  Fetal movement: yes      O:  /64 (BP Location: Right arm, Patient Position: Sitting, Cuff Size: Standard)   Pulse 68   Ht 5' 4\" (1.626 m)   Wt 62.8 kg (138 lb 6.4 oz)   LMP 2024 (Exact Date)   SpO2 100%   BMI 23.76 kg/m²       Review of Systems   Constitutional:  Negative for chills and fever.   Eyes:  Negative for visual disturbance.   Respiratory:  Negative for chest tightness and shortness of breath.    Cardiovascular:  Negative for chest pain.   Gastrointestinal:  Negative for abdominal pain, diarrhea, nausea and vomiting.   Genitourinary:  Negative for pelvic pain and vaginal bleeding.        As noted in HPI   Skin:  Negative for rash.   Neurological:  Negative for headaches.   All other systems reviewed and are negative.        Physical Exam  Constitutional:       General: She is not in acute distress.     Appearance: Normal appearance.   HENT:      Head: Normocephalic and atraumatic.   Cardiovascular:      Rate and Rhythm: Normal rate.   Pulmonary:      Effort: Pulmonary effort is normal. No respiratory distress.   Abdominal:      General: There is no distension.      Palpations: Abdomen is soft.      Tenderness: There is no abdominal tenderness. There is no guarding or rebound.      Comments: gravid   Neurological:      General: No focal deficit present.      Mental Status: She is alert.   Psychiatric:         Mood and Affect: Mood normal.         Behavior: Behavior normal.   Vitals and nursing note reviewed.           Fundal Height (cm): 30 cm  Fetal Heart Rate: " 150    Pregravid Weight/BMI: 56.7 kg (125 lb) (BMI 21.45)  Current Weight: 62.8 kg (138 lb 6.4 oz)   Total Weight Gain: 6.078 kg (13 lb 6.4 oz)     Pre- Vitals      Flowsheet Row Most Recent Value   Prenatal Assessment    Fetal Heart Rate 150   Fundal Height (cm) 30 cm   Movement Present   Prenatal Vitals    Blood Pressure 116/64   Weight - Scale 62.8 kg (138 lb 6.4 oz)   Urine Albumin/Glucose    Dilation/Effacement/Station    Vaginal Drainage    Edema                     Problem List       Short interval between pregnancies affecting pregnancy, antepartum    Overview   About 6 months between pregnancy and conception          Low-lying placenta    30 weeks gestation of pregnancy    Supervision of low-risk pregnancy, third trimester    Overview   NIPT low risk. msAFP not done  Declines flu vaccine  S/p tdap vaccine   Got RSV vaccine in  pregnancy          Current Assessment & Plan   Delivery consent reviewed and signed  Growth US scheduled  OB precautions reviewed                              Cheri Gamble MD  2024  4:04 PM

## 2024-12-15 PROBLEM — Z3A.32 32 WEEKS GESTATION OF PREGNANCY: Status: ACTIVE | Noted: 2024-12-15

## 2024-12-20 ENCOUNTER — ULTRASOUND (OUTPATIENT)
Dept: PERINATAL CARE | Facility: OTHER | Age: 29
End: 2024-12-20
Payer: COMMERCIAL

## 2024-12-20 VITALS
HEIGHT: 64 IN | DIASTOLIC BLOOD PRESSURE: 60 MMHG | WEIGHT: 140.4 LBS | HEART RATE: 70 BPM | SYSTOLIC BLOOD PRESSURE: 100 MMHG | BODY MASS INDEX: 23.97 KG/M2

## 2024-12-20 DIAGNOSIS — O09.899 SHORT INTERVAL BETWEEN PREGNANCIES AFFECTING PREGNANCY, ANTEPARTUM: ICD-10-CM

## 2024-12-20 DIAGNOSIS — Z36.89 ENCOUNTER FOR ULTRASOUND TO ASSESS FETAL GROWTH: ICD-10-CM

## 2024-12-20 DIAGNOSIS — Z3A.32 32 WEEKS GESTATION OF PREGNANCY: Primary | ICD-10-CM

## 2024-12-20 DIAGNOSIS — O44.40 LOW-LYING PLACENTA: ICD-10-CM

## 2024-12-20 PROCEDURE — 76816 OB US FOLLOW-UP PER FETUS: CPT | Performed by: OBSTETRICS & GYNECOLOGY

## 2024-12-20 PROCEDURE — 76817 TRANSVAGINAL US OBSTETRIC: CPT | Performed by: OBSTETRICS & GYNECOLOGY

## 2024-12-20 PROCEDURE — 99212 OFFICE O/P EST SF 10 MIN: CPT | Performed by: OBSTETRICS & GYNECOLOGY

## 2024-12-20 NOTE — PROGRESS NOTES
"[facility]: Deborah Way was seen today for followup placental location ultrasound with fetal growth measurement.  See ultrasound report under \"OB Procedures\" tab.   Please don't hesitate to contact our office with any concerns or questions.  -Cheryl Barnes MD    "

## 2025-01-03 ENCOUNTER — TELEPHONE (OUTPATIENT)
Dept: OBGYN CLINIC | Facility: CLINIC | Age: 30
End: 2025-01-03

## 2025-01-06 ENCOUNTER — TELEPHONE (OUTPATIENT)
Dept: OBGYN CLINIC | Facility: CLINIC | Age: 30
End: 2025-01-06

## 2025-01-16 ENCOUNTER — ROUTINE PRENATAL (OUTPATIENT)
Dept: OBGYN CLINIC | Facility: CLINIC | Age: 30
End: 2025-01-16

## 2025-01-16 VITALS
HEIGHT: 64 IN | BODY MASS INDEX: 25.23 KG/M2 | WEIGHT: 147.8 LBS | DIASTOLIC BLOOD PRESSURE: 68 MMHG | SYSTOLIC BLOOD PRESSURE: 130 MMHG

## 2025-01-16 DIAGNOSIS — Z34.93 SUPERVISION OF LOW-RISK PREGNANCY, THIRD TRIMESTER: ICD-10-CM

## 2025-01-16 DIAGNOSIS — O09.899 SHORT INTERVAL BETWEEN PREGNANCIES AFFECTING PREGNANCY, ANTEPARTUM: Primary | ICD-10-CM

## 2025-01-16 DIAGNOSIS — Z3A.36 36 WEEKS GESTATION OF PREGNANCY: ICD-10-CM

## 2025-01-16 PROBLEM — O44.40 LOW-LYING PLACENTA: Status: RESOLVED | Noted: 2024-09-27 | Resolved: 2025-01-16

## 2025-01-16 PROCEDURE — 87150 DNA/RNA AMPLIFIED PROBE: CPT | Performed by: OBSTETRICS & GYNECOLOGY

## 2025-01-16 PROCEDURE — PNV: Performed by: OBSTETRICS & GYNECOLOGY

## 2025-01-16 NOTE — PROGRESS NOTES
"    S: 29 y.o.  36w0d here for routine prenatal visit.        Chief Complaint   Patient presents with    Routine Prenatal Visit         OB complaints:  Contractions: occasional, not painful or regular   Leakage: no  Bleeding: no  Fetal movement: yes      O:  /68 (BP Location: Right arm, Patient Position: Sitting, Cuff Size: Adult)   Ht 5' 4\" (1.626 m)   Wt 67 kg (147 lb 12.8 oz)   LMP 2024 (Exact Date)   BMI 25.37 kg/m²       Review of Systems   Constitutional:  Negative for chills and fever.   Eyes:  Negative for visual disturbance.   Respiratory:  Negative for chest tightness and shortness of breath.    Cardiovascular:  Negative for chest pain.   Gastrointestinal:  Negative for abdominal pain, diarrhea, nausea and vomiting.   Genitourinary:  Negative for pelvic pain and vaginal bleeding.        As noted in HPI   Skin:  Negative for rash.   Neurological:  Negative for headaches.   All other systems reviewed and are negative.        Physical Exam  Constitutional:       General: She is not in acute distress.     Appearance: Normal appearance.   HENT:      Head: Normocephalic and atraumatic.   Cardiovascular:      Rate and Rhythm: Normal rate.   Pulmonary:      Effort: Pulmonary effort is normal. No respiratory distress.   Abdominal:      General: There is no distension.      Palpations: Abdomen is soft.      Tenderness: There is no abdominal tenderness. There is no guarding or rebound.      Comments: gravid   Neurological:      General: No focal deficit present.      Mental Status: She is alert.   Psychiatric:         Mood and Affect: Mood normal.         Behavior: Behavior normal.   Vitals and nursing note reviewed. Exam conducted with a chaperone present.           Fundal Height (cm): 36 cm  Fetal Heart Rate: 155    Pregravid Weight/BMI: 56.7 kg (125 lb) (BMI 21.45)  Current Weight: 67 kg (147 lb 12.8 oz)   Total Weight Gain: 10.3 kg (22 lb 12.8 oz)     Pre- Vitals      Flowsheet Row " Most Recent Value   Prenatal Assessment    Fetal Heart Rate 155   Fundal Height (cm) 36 cm   Movement Present   Presentation Vertex   Prenatal Vitals    Blood Pressure 130/68   Weight - Scale 67 kg (147 lb 12.8 oz)   Urine Albumin/Glucose    Dilation/Effacement/Station    Vaginal Drainage    Edema                     Problem List       Short interval between pregnancies affecting pregnancy, antepartum    Overview   About 6 months between pregnancy and conception          Supervision of low-risk pregnancy, third trimester    Overview   NIPT low risk. msAFP not done  Declines flu vaccine  S/p tdap vaccine   Got RSV vaccine in 2023 pregnancy   Delivery consent signed          Current Assessment & Plan   GBS collected today  Recent growth US normal, no low lying placenta, no further scheduled  Reviewed pain management in labor - she may not want an epidural   OB precautions reviewed         36 weeks gestation of pregnancy                         Cheri Gamble MD  1/16/2025  9:38 AM

## 2025-01-16 NOTE — ASSESSMENT & PLAN NOTE
GBS collected today  Recent growth US normal, no low lying placenta, no further scheduled  Reviewed pain management in labor - she may not want an epidural   OB precautions reviewed

## 2025-01-17 ENCOUNTER — NURSE TRIAGE (OUTPATIENT)
Age: 30
End: 2025-01-17

## 2025-01-17 ENCOUNTER — HOSPITAL ENCOUNTER (INPATIENT)
Facility: HOSPITAL | Age: 30
LOS: 2 days | Discharge: HOME | End: 2025-01-19
Attending: OBSTETRICS & GYNECOLOGY | Admitting: OBSTETRICS & GYNECOLOGY
Payer: COMMERCIAL

## 2025-01-17 DIAGNOSIS — Z3A.36 36 WEEKS GESTATION OF PREGNANCY: Primary | ICD-10-CM

## 2025-01-17 PROBLEM — O41.00X0 OLIGOHYDRAMNIOS: Status: ACTIVE | Noted: 2025-01-17

## 2025-01-17 PROBLEM — Z34.90 ENCOUNTER FOR INDUCTION OF LABOR: Status: ACTIVE | Noted: 2025-01-17

## 2025-01-17 LAB
ABO GROUP BLD: NORMAL
BLD GP AB SCN SERPL QL: NEGATIVE
ERYTHROCYTE [DISTWIDTH] IN BLOOD BY AUTOMATED COUNT: 12.6 % (ref 11.6–15.1)
HCT VFR BLD AUTO: 38.6 % (ref 34.8–46.1)
HGB BLD-MCNC: 13.3 G/DL (ref 11.5–15.4)
HOLD SPECIMEN: YES
MCH RBC QN AUTO: 30.2 PG (ref 26.8–34.3)
MCHC RBC AUTO-ENTMCNC: 34.5 G/DL (ref 31.4–37.4)
MCV RBC AUTO: 88 FL (ref 82–98)
PLATELET # BLD AUTO: 264 THOUSANDS/UL (ref 149–390)
PMV BLD AUTO: 9.4 FL (ref 8.9–12.7)
RBC # BLD AUTO: 4.41 MILLION/UL (ref 3.81–5.12)
RH BLD: POSITIVE
SPECIMEN EXPIRATION DATE: NORMAL
WBC # BLD AUTO: 10.66 THOUSAND/UL (ref 4.31–10.16)

## 2025-01-17 PROCEDURE — NC001 PR NO CHARGE: Performed by: OBSTETRICS & GYNECOLOGY

## 2025-01-17 PROCEDURE — 85027 COMPLETE CBC AUTOMATED: CPT

## 2025-01-17 PROCEDURE — 99203 OFFICE O/P NEW LOW 30 MIN: CPT

## 2025-01-17 PROCEDURE — 86901 BLOOD TYPING SEROLOGIC RH(D): CPT

## 2025-01-17 PROCEDURE — 86850 RBC ANTIBODY SCREEN: CPT

## 2025-01-17 PROCEDURE — 4A1HXCZ MONITORING OF PRODUCTS OF CONCEPTION, CARDIAC RATE, EXTERNAL APPROACH: ICD-10-PCS | Performed by: OBSTETRICS & GYNECOLOGY

## 2025-01-17 PROCEDURE — 86900 BLOOD TYPING SEROLOGIC ABO: CPT

## 2025-01-17 PROCEDURE — 76815 OB US LIMITED FETUS(S): CPT | Performed by: OBSTETRICS & GYNECOLOGY

## 2025-01-17 PROCEDURE — 76815 OB US LIMITED FETUS(S): CPT

## 2025-01-17 PROCEDURE — 86780 TREPONEMA PALLIDUM: CPT

## 2025-01-17 RX ORDER — SODIUM CHLORIDE, SODIUM LACTATE, POTASSIUM CHLORIDE, CALCIUM CHLORIDE 600; 310; 30; 20 MG/100ML; MG/100ML; MG/100ML; MG/100ML
125 INJECTION, SOLUTION INTRAVENOUS CONTINUOUS
Status: DISCONTINUED | OUTPATIENT
Start: 2025-01-17 | End: 2025-01-18

## 2025-01-17 RX ORDER — BUPIVACAINE HYDROCHLORIDE 2.5 MG/ML
30 INJECTION, SOLUTION EPIDURAL; INFILTRATION; INTRACAUDAL ONCE AS NEEDED
Status: DISCONTINUED | OUTPATIENT
Start: 2025-01-17 | End: 2025-01-18

## 2025-01-17 RX ORDER — ONDANSETRON 2 MG/ML
4 INJECTION INTRAMUSCULAR; INTRAVENOUS EVERY 4 HOURS PRN
Status: DISCONTINUED | OUTPATIENT
Start: 2025-01-17 | End: 2025-01-18

## 2025-01-17 RX ADMIN — SODIUM CHLORIDE, SODIUM LACTATE, POTASSIUM CHLORIDE, AND CALCIUM CHLORIDE 125 ML/HR: .6; .31; .03; .02 INJECTION, SOLUTION INTRAVENOUS at 18:37

## 2025-01-17 RX ADMIN — Medication 50 MCG: at 23:25

## 2025-01-17 RX ADMIN — SODIUM CHLORIDE 2.5 MILLION UNITS: 9 INJECTION, SOLUTION INTRAVENOUS at 22:40

## 2025-01-17 RX ADMIN — SODIUM CHLORIDE 5 MILLION UNITS: 0.9 INJECTION, SOLUTION INTRAVENOUS at 18:37

## 2025-01-17 NOTE — ASSESSMENT & PLAN NOTE
Varela IUP  Prenatal labs reviewed  New onset of oligohydramnios with indication for delivery at 36w1d

## 2025-01-17 NOTE — ASSESSMENT & PLAN NOTE
New diagnosis of oligohydramnios with LOLA 2.98 cm  Rupture workup negative - ferning negative, pooling negative  Possible evolving  labor with ctx q5m and SVE 1/50/-3  Plan for induction if cervix remains unchanged

## 2025-01-17 NOTE — H&P
H & P- Obstetrics   Deborah Way 29 y.o. female MRN: [id number]  Unit/Bed#: -01 Encounter: [id number]    Assessment: 29 y.o.  at 36w1d admitted for oligohydramnios and delivery.  SVE: 1/50/-3  FHT: 135 bpm  Clinical EFW: 7lb ; Cephalic confirmed by TAUS  GBS status: unknown       Plan:   Oligohydramnios  Assessment & Plan  New diagnosis of oligohydramnios with LOLA 2.98 cm  Rupture workup negative - ferning negative, pooling negative  Possible evolving  labor with ctx q5m and SVE 1/50/-3  Plan for induction if cervix remains unchanged    36 weeks gestation of pregnancy  Assessment & Plan  Varela IUP  Prenatal labs reviewed  New onset of oligohydramnios with indication for delivery at 36w1d    * Encounter for induction of labor  Assessment & Plan  Admit   T&S, CBC, RPR  CLD  IV fluids  GBS prophylaxis needed, PCN ordered  2h recheck  Plan for induction pending cervical change          Discussed case and plan w/ Dr. Lynne      Chief Complaint: leaking fluid    HPI: Deborah Way is a 29 y.o.  with an ERVIN of 2025, by Ultrasound at 36w1d who is being admitted for new diagnosis of oligohydramnios with indication for delivery. She reports abdominal cramping with ctx on monitor q5m, has light LOF with negative rupture workup, and reports no VB. She states she has felt good FM. We discussed negative rupture workup but new diagnosis of oligohydramnios on LOLA screening with indication for delivery. Reviewed induction process in detail in the event her cervix does not continue to make change. All questions answered.    Patient Active Problem List   Diagnosis    Short interval between pregnancies affecting pregnancy, antepartum    Supervision of low-risk pregnancy, third trimester    36 weeks gestation of pregnancy    Encounter for induction of labor    Oligohydramnios       Baby complications/comments: none apparent    Review of Systems   Constitutional:  Negative for chills and fever.    HENT:  Negative for congestion, sinus pressure and sinus pain.    Eyes:  Negative for visual disturbance.   Respiratory:  Negative for cough, shortness of breath and wheezing.    Cardiovascular:  Negative for chest pain, palpitations and leg swelling.   Gastrointestinal:  Negative for abdominal pain, constipation, diarrhea, nausea and vomiting.   Genitourinary:  Negative for dysuria, vaginal bleeding and vaginal discharge.   Skin:  Negative for color change, pallor and rash.   Neurological:  Negative for weakness and light-headedness.   Psychiatric/Behavioral:  Negative for agitation and behavioral problems.        OB Hx:  OB History    Para Term  AB Living   2 1 1 0 0 1   SAB IAB Ectopic Multiple Live Births   0 0 0 0 1      # Outcome Date GA Lbr Thony/2nd Weight Sex Type Anes PTL Lv   2 Current            1 Term 23 39w0d  3742 g (8 lb 4 oz) F Vag-Spont EPI N GABRIELLE      Birth Comments: kidney infection week prior to delivery       Past Medical Hx:  No past medical history on file.    Past Surgical hx:  No past surgical history on file.        No Known Allergies      Medications Prior to Admission:     calcium citrate-vitamin D 315 mg-5 mcg tablet    Prenatal MV-Min-Fe Fum-FA-DHA (PRENATAL 1 PO)    Objective:  HR:  [73] 73  BP: (129)/(69) 129/69  There is no height or weight on file to calculate BMI.     Physical Exam:  Physical Exam  Constitutional:       General: She is not in acute distress.  Genitourinary:      Vulva normal.   HENT:      Head: Normocephalic.      Right Ear: External ear normal.      Left Ear: External ear normal.   Eyes:      General: No scleral icterus.        Right eye: No discharge.         Left eye: No discharge.      Conjunctiva/sclera: Conjunctivae normal.   Cardiovascular:      Rate and Rhythm: Normal rate.      Pulses: Normal pulses.   Pulmonary:      Effort: Pulmonary effort is normal.   Abdominal:      Palpations: Abdomen is soft.      Tenderness: There is no  "abdominal tenderness. There is no guarding.      Comments: Gravid uterus   Musculoskeletal:         General: No swelling or tenderness. Normal range of motion.      Cervical back: Normal range of motion.      Right lower leg: No edema.      Left lower leg: No edema.   Neurological:      Mental Status: She is alert and oriented to person, place, and time. Mental status is at baseline.   Skin:     General: Skin is warm and dry.      Capillary Refill: Capillary refill takes less than 2 seconds.   Psychiatric:         Behavior: Behavior normal.         Thought Content: Thought content normal.   Vitals and nursing note reviewed. Exam conducted with a chaperone present.            FHT:   135 bpm, moderate variability, 15 x 15 accelerations, no decelerations    TOCO:    Ctx q5m    Lab Results   Component Value Date    WBC 9.21 11/27/2024    HGB 11.8 11/27/2024    HCT 34.4 (L) 11/27/2024     11/27/2024     No results found for: \"NA\", \"K\", \"CL\", \"CO2\", \"BUN\", \"CREATININE\", \"GLUCOSE\", \"AST\", \"ALT\"  Prenatal Labs: Reviewed     Blood type: A pos  Antibody: neg  GBS: in process  HIV: neg  Rubella: immune  Syphilis IgM/IgG: neg  HBsAg: neg  HCAb: neg  Chlamydia: neg  Gonorrhea: neg  Diabetes 1 hour screen: wnl  3 hour glucose: na  Platelets: 295  Hgb: 11.8  >2 Midnights  INPATIENT     Signature/Title: Familia Mathew MD  Date: 1/17/2025  Time: 6:10 PM    "

## 2025-01-17 NOTE — ASSESSMENT & PLAN NOTE
Routine postpartum care  Encourage ambulation  Encourage familial bonding  Lactation support as needed  Pain: Motrin/Tylenol around the clock, oxycodone if needed

## 2025-01-17 NOTE — TELEPHONE ENCOUNTER
"Pt is 36w1d, , calling with onset of trickling of fluid overnight, but more significant this morning. Notes clear fluid that trickles. Had to change pants and underwear a few times. Denies contractions, vaginal bleeding. Endorses positive fetal movement. RN advised go to LD for further evaluation. Pt stated that provider advised her she can stay home and labor at home even with rupture of membranes until she cannot tolerate contractions anymore. RN advised will double check with provider. Wondering if provider meant if contractions are the first symptom of labor, and while experiencing persistent contractions, water breaks. However, at this time, pt reports no contractions at all. RN to see how long OK for patient to wait at home before going to LD given no contractions. Advised pt that prolonged rupture of membranes without intervention can increase her risk of infection. Advised avoid anything in the vagina at this point on. Pt verbalized an understanding.     ESC sent to provider on call, Dr Serrato. Per Dr. Serrato, recommendation from provider to labor at home was with assumption of being GBS negative and full term. However, GBS taken yesterday is still in process so GBS status is unknown, and pt is still considered . Would like pt to come in to Labor and Delivery. Advised pt does not need to rush, but would like her to come in when able to today.     RN placed a call to patient with update. Pt verbalized an understanding. Will go to Labor and Delivery [facility].      RN sent ESC notifying [facility] Charge RN.     Reason for Disposition  • Leakage of fluid from vagina  (Exception: Patient is uncertain, but thinks it might be urine incontinence.)    Answer Assessment - Initial Assessment Questions  1. ONSET: \"When did you notice the fluid coming out of your vagina?\"         Overnight - Trickling - intermittent, had to change underwear and pants.   2. CONTRACTIONS: \"Are you having " "any contractions?\" If Yes, ask: \"Describe the contractions that you are having.\" (e.g., duration, frequency, regularity, severity)      Denies  3. ERVIN: \"What date are you expecting to deliver?\"      25  4. PARITY: \"Have you had a baby before?\" If Yes, ask: \"How long did the labor last?\"        5. FETAL MOVEMENT: \"Has the baby's movement decreased or changed significantly from normal?\"      Positive  6. OTHER SYMPTOMS: \"Do you have any other symptoms?\" (e.g., abdomen pain, fever, hand or face swelling, vaginal bleeding)      Chunks of mucous plug coming out with trickling.    Protocols used: Pregnancy - Rupture of Membranes Suspected-Adult-OH    "

## 2025-01-17 NOTE — H&P
H & P- Obstetrics   Deborah Way 29 y.o. female MRN: [id number]  Unit/Bed#: -01 Encounter: [id number]    Assessment: 29 y.o.  at 36w1d admitted for induction of labor for oligohydramnios  SVE:    FHT:  Clinical EFW: 6 ; Cephalic confirmed by ultrasound  GBS status: pending   Postpartum contraception plan: ***    Plan:   Oligohydramnios  Assessment & Plan  R/o ROM is negative  Indication for IOL  LOLA **    36 weeks gestation of pregnancy  Assessment & Plan  -Prenatal panel completed  -28 week labs completed  -MFM scans completed    * Encounter for induction of labor  Assessment & Plan  Admit   T&S, CBC, RPR  CLD  IV fluids  GBS prophylaxis needed, PCN ordered  Expectant management            Discussed case and plan w/  ***      Chief Complaint: {scmobyo:32488}    HPI: Deborah Way is a 29 y.o.  with an ERVIN of 2025, by Ultrasound at 36w1d who is being admitted for {scmobprob:21377}. She {gen contractions:890829}, has {scmLOF:73884}, and reports {scmobvb:47146}. She {scmfm:}    Patient Active Problem List   Diagnosis    Short interval between pregnancies affecting pregnancy, antepartum    Supervision of low-risk pregnancy, third trimester    36 weeks gestation of pregnancy    Encounter for induction of labor    Oligohydramnios       Baby complications/comments: none***    Review of Systems    OB Hx:  OB History    Para Term  AB Living   2 1 1 0 0 1   SAB IAB Ectopic Multiple Live Births   0 0 0 0 1      # Outcome Date GA Lbr Thony/2nd Weight Sex Type Anes PTL Lv   2 Current            1 Term 23 39w0d  3742 g (8 lb 4 oz) F Vag-Spont EPI N GABRIELLE      Birth Comments: kidney infection week prior to delivery       Past Medical Hx:  No past medical history on file.    Past Surgical hx:  No past surgical history on file.    Social Hx:  Alcohol use: ***  Tobacco use: ***  Other substance use: ***    Other: ***    No Known Allergies      Medications Prior to Admission:      "calcium citrate-vitamin D 315 mg-5 mcg tablet    Prenatal MV-Min-Fe Fum-FA-DHA (PRENATAL 1 PO)    Objective:  HR:  [73] 73  BP: (129)/(69) 129/69  There is no height or weight on file to calculate BMI.     Physical Exam:  OBGyn Exam       FHT:       TOCO:        Lab Results   Component Value Date    WBC 9.21 11/27/2024    HGB 11.8 11/27/2024    HCT 34.4 (L) 11/27/2024     11/27/2024     No results found for: \"NA\", \"K\", \"CL\", \"CO2\", \"BUN\", \"CREATININE\", \"GLUCOSE\", \"AST\", \"ALT\"  Prenatal Labs: Reviewed ***     Bloodtype:A  Rh Factor (no units)   Date Value   08/07/2024 Positive     No results found for: \"STREPGRPB\"  No results found for: \"HIVAGAB\"  Rubella IgG Quant (IU/mL)   Date Value   08/07/2024 315.5     No results found for: \"VDRL\"  No results found for: \"RPR\"  Hepatitis B Surface Ag (no units)   Date Value   08/07/2024 Non-reactive     No results found for: \"LABCHLA\", \"LABNGO\"  Glucose (mg/dL)   Date Value   11/27/2024 76     GLUCOSE 3 HOUR: ***  Platelets (Thousands/uL)   Date Value   11/27/2024 295   08/07/2024 339     Hemoglobin (g/dL)   Date Value   11/27/2024 11.8   08/07/2024 13.3     {Expected LOS:90779}  {Admission Status:32277}      Signature/Title: Colette Lehman MD  Date: 1/17/2025  Time: 6:03 PM   "

## 2025-01-17 NOTE — PROCEDURES
Dbeorah Way, a  at 36w1d with an ERVIN of 2025, by Ultrasound, was seen at [facility] for the following procedure(s): $Procedure Type: LOLA]         4 Quadrant LOLA  LOLA Q1 (cm): 0 cm  LOLA Q2 (cm): 0.9 cm  LOLA Q3 (cm): 1 cm  LOLA Q4 (cm): 1.1 cm  LOLA TOTAL (cm): 3 cm  LVP (cm): 1.1 cm

## 2025-01-17 NOTE — TELEPHONE ENCOUNTER
Regarding: possible water breakage  ----- Message from Kailey CANDELARIO sent at 1/17/2025 11:39 AM EST -----  36 1/2 weeks pregnant patient thinks that her waster broke. She had to change her pants multiple times today.not lauro

## 2025-01-18 ENCOUNTER — ANESTHESIA EVENT (INPATIENT)
Dept: ANESTHESIOLOGY | Facility: HOSPITAL | Age: 30
End: 2025-01-18
Payer: COMMERCIAL

## 2025-01-18 ENCOUNTER — ANESTHESIA (INPATIENT)
Dept: ANESTHESIOLOGY | Facility: HOSPITAL | Age: 30
End: 2025-01-18
Payer: COMMERCIAL

## 2025-01-18 LAB
BASE EXCESS BLDCOA CALC-SCNC: -2 MMOL/L (ref 3–11)
BASE EXCESS BLDCOV CALC-SCNC: -4.3 MMOL/L (ref 1–9)
HCO3 BLDCOA-SCNC: 25.5 MMOL/L (ref 17.3–27.3)
HCO3 BLDCOV-SCNC: 21 MMOL/L (ref 12.2–28.6)
O2 CT VFR BLDCOA CALC: 5.9 ML/DL
OXYHGB MFR BLDCOA: 25.9 %
OXYHGB MFR BLDCOV: 52.5 %
PCO2 BLDCOA: 53.6 MM[HG] (ref 30–60)
PCO2 BLDCOV: 39.8 MM HG (ref 27–43)
PH BLDCOA: 7.29 [PH] (ref 7.23–7.43)
PH BLDCOV: 7.34 [PH] (ref 7.19–7.49)
PO2 BLDCOA: 14.1 MM HG (ref 5–25)
PO2 BLDCOV: 20.3 MM HG (ref 15–45)
SAO2 % BLDCOV: 12.7 ML/DL
TREPONEMA PALLIDUM IGG+IGM AB [PRESENCE] IN SERUM OR PLASMA BY IMMUNOASSAY: NORMAL

## 2025-01-18 PROCEDURE — 82805 BLOOD GASES W/O2 SATURATION: CPT | Performed by: OBSTETRICS & GYNECOLOGY

## 2025-01-18 PROCEDURE — 59400 OBSTETRICAL CARE: CPT | Performed by: OBSTETRICS & GYNECOLOGY

## 2025-01-18 RX ORDER — DIPHENHYDRAMINE HCL 25 MG
25 TABLET ORAL EVERY 6 HOURS PRN
Status: DISCONTINUED | OUTPATIENT
Start: 2025-01-18 | End: 2025-01-19 | Stop reason: HOSPADM

## 2025-01-18 RX ORDER — OXYTOCIN/RINGER'S LACTATE 30/500 ML
PLASTIC BAG, INJECTION (ML) INTRAVENOUS
Status: COMPLETED
Start: 2025-01-18 | End: 2025-01-18

## 2025-01-18 RX ORDER — ACETAMINOPHEN 325 MG/1
650 TABLET ORAL EVERY 4 HOURS PRN
Status: DISCONTINUED | OUTPATIENT
Start: 2025-01-18 | End: 2025-01-19 | Stop reason: HOSPADM

## 2025-01-18 RX ORDER — DOCUSATE SODIUM 100 MG/1
100 CAPSULE, LIQUID FILLED ORAL 2 TIMES DAILY
Status: DISCONTINUED | OUTPATIENT
Start: 2025-01-18 | End: 2025-01-19 | Stop reason: HOSPADM

## 2025-01-18 RX ORDER — CHLOROPROCAINE HYDROCHLORIDE 30 MG/ML
INJECTION, SOLUTION EPIDURAL; INFILTRATION; INTRACAUDAL; PERINEURAL AS NEEDED
Status: DISCONTINUED | OUTPATIENT
Start: 2025-01-18 | End: 2025-01-18 | Stop reason: HOSPADM

## 2025-01-18 RX ORDER — BENZOCAINE/MENTHOL 6 MG-10 MG
1 LOZENGE MUCOUS MEMBRANE DAILY PRN
Status: DISCONTINUED | OUTPATIENT
Start: 2025-01-18 | End: 2025-01-19 | Stop reason: HOSPADM

## 2025-01-18 RX ORDER — ONDANSETRON 2 MG/ML
4 INJECTION INTRAMUSCULAR; INTRAVENOUS EVERY 8 HOURS PRN
Status: DISCONTINUED | OUTPATIENT
Start: 2025-01-18 | End: 2025-01-19 | Stop reason: HOSPADM

## 2025-01-18 RX ORDER — IBUPROFEN 600 MG/1
600 TABLET, FILM COATED ORAL EVERY 6 HOURS
Status: DISCONTINUED | OUTPATIENT
Start: 2025-01-18 | End: 2025-01-19 | Stop reason: HOSPADM

## 2025-01-18 RX ORDER — LIDOCAINE HCL/EPINEPHRINE/PF 2%-1:200K
VIAL (ML) INJECTION AS NEEDED
Status: DISCONTINUED | OUTPATIENT
Start: 2025-01-18 | End: 2025-01-18 | Stop reason: HOSPADM

## 2025-01-18 RX ORDER — OXYTOCIN/RINGER'S LACTATE 30/500 ML
250 PLASTIC BAG, INJECTION (ML) INTRAVENOUS ONCE
Status: COMPLETED | OUTPATIENT
Start: 2025-01-18 | End: 2025-01-18

## 2025-01-18 RX ORDER — CALCIUM CARBONATE 500 MG/1
1000 TABLET, CHEWABLE ORAL DAILY PRN
Status: DISCONTINUED | OUTPATIENT
Start: 2025-01-18 | End: 2025-01-19 | Stop reason: HOSPADM

## 2025-01-18 RX ORDER — DIPHENHYDRAMINE HYDROCHLORIDE 50 MG/ML
12.5 INJECTION INTRAMUSCULAR; INTRAVENOUS EVERY 6 HOURS PRN
Status: DISCONTINUED | OUTPATIENT
Start: 2025-01-18 | End: 2025-01-18

## 2025-01-18 RX ORDER — ROPIVACAINE HYDROCHLORIDE 2 MG/ML
INJECTION, SOLUTION EPIDURAL; INFILTRATION; PERINEURAL
Status: COMPLETED | OUTPATIENT
Start: 2025-01-18 | End: 2025-01-18

## 2025-01-18 RX ADMIN — Medication 250 MILLI-UNITS/MIN: at 03:40

## 2025-01-18 RX ADMIN — DOCUSATE SODIUM 100 MG: 100 CAPSULE, LIQUID FILLED ORAL at 18:06

## 2025-01-18 RX ADMIN — ROPIVACAINE HYDROCHLORIDE 10 ML: 2 INJECTION, SOLUTION EPIDURAL; INFILTRATION at 01:49

## 2025-01-18 RX ADMIN — ROPIVACAINE HYDROCHLORIDE: 2 INJECTION, SOLUTION EPIDURAL; INFILTRATION at 01:55

## 2025-01-18 RX ADMIN — IBUPROFEN 600 MG: 600 TABLET, FILM COATED ORAL at 12:39

## 2025-01-18 RX ADMIN — IBUPROFEN 600 MG: 600 TABLET, FILM COATED ORAL at 07:06

## 2025-01-18 RX ADMIN — OXYTOCIN 250 MILLI-UNITS/MIN: 10 INJECTION INTRAVENOUS at 03:40

## 2025-01-18 RX ADMIN — CHLOROPROCAINE HYDROCHLORIDE 3 ML: 30 INJECTION, SOLUTION EPIDURAL; INFILTRATION; INTRACAUDAL; PERINEURAL at 02:03

## 2025-01-18 RX ADMIN — SODIUM CHLORIDE 2.5 MILLION UNITS: 9 INJECTION, SOLUTION INTRAVENOUS at 03:21

## 2025-01-18 RX ADMIN — LIDOCAINE HYDROCHLORIDE,EPINEPHRINE BITARTRATE 3 ML: 20; .005 INJECTION, SOLUTION EPIDURAL; INFILTRATION; INTRACAUDAL; PERINEURAL at 02:03

## 2025-01-18 RX ADMIN — IBUPROFEN 600 MG: 600 TABLET, FILM COATED ORAL at 18:06

## 2025-01-18 RX ADMIN — ROPIVACAINE HYDROCHLORIDE 10 ML/HR: 2 INJECTION, SOLUTION EPIDURAL; INFILTRATION at 01:53

## 2025-01-18 NOTE — L&D DELIVERY NOTE
DELIVERY NOTE  Deborah Way 29 y.o. female MRN: [id number]  Unit/Bed#: -01 Encounter: [id number]    Obstetrician:    Dr. Debbie Lynne MD    Assistant:   Dr. Colette Lehman MD    Pre-Delivery Diagnosis:   Patient Active Problem List   Diagnosis    Short interval between pregnancies affecting pregnancy, antepartum    Supervision of low-risk pregnancy, third trimester    36 weeks gestation of pregnancy    Encounter for induction of labor    Oligohydramnios     Post-Delivery Diagnosis:   Same as above - Delivered    Procedure:  Spontaneous vaginal delivery    Anesthesia:  epidural    Specimens:   Cord blood obtained   Placenta; normal appearing, central insertion, intact   Arterial and venous blood gases (below)     Gases:  Umbilical Cord Venous Blood Gas:  Results from last 7 days   Lab Units 25  0335   PH COV  7.341   PCO2 COV mm HG 39.8   HCO3 COV mmol/L 21.0   BASE EXC COV mmol/L -4.3*   O2 CT CD VB mL/dL 12.7   O2 HGB, VENOUS CORD % 52.5     Umbilical Cord Arterial Blood Gas:  Results from last 7 days   Lab Units 25  0335   PH COA  7.295   PCO2 COA  53.6   PO2 COA mm HG 14.1   HCO3 COA mmol/L 25.5   BASE EXC COA mmol/L -2.0*   O2 CONTENT CORD ART ml/dl 5.9   O2 HGB, ARTERIAL CORD % 25.9       Quantitative Blood Loss:   47 mL           Complications:    None    Brief Description of Labor Course:  Deborah Way is a 29 y.o.  female at 36w2d who was admitted to L&D for oligohydramnios. Her labor course was notable for induction with cytotec followed by epidural for pain control. She progressed to complete at 0325, pushed once, and delivered a healthy  at 0335.     Description of Delivery:   With  the assistance of maternal expulsive forces, the fetal vertex delivered spontaneously. A nuchal cord was noted and reduced. The anterior left shoulder was delivered atraumatically with gentle downward traction. The contralateral arm was delivered with gentle upward traction and maternal  expulsive forces. The remainder of the fetus delivered spontaneously, resulting in a viable male .     Upon delivery, the infant was placed on the mothers abdomen and the cord was doubly clamped and cut after 30 seconds. The  was noted to have good tone and cry spontaneously. There was no evidence of injury.  The  was passed off to  staff for evaluation. Umbilical cord blood and umbilical artery and venous gases were collected and sent to the lab. An intact placenta was delivered spontaneously using fundal massage and gentle cord traction and was noted to have a centrally -inserted 3-vessel cord. Active management of the third stage of labor was undertaken with IV pitocin at 250 milliunits/min.    Inspection of the perineum, vagina, labia, cervix, and urethra revealed no lacerations. Bleeding was noted to be under control. A bimanual exam was performed and tone was firm.       Outcome:  Living  with APGARS 9 (1 min) and 9 (5 min).    weight: pending    Perineal Inspection/Laceration RepairAt the conclusion of the delivery, all needle, sponge, and instrument counts were noted to be correct. Patient tolerated the procedure well and was allowed to recover in labor and delivery room with family and  before being transferred to the post-partum floor.     Conclusion:  Mother and baby are currently recovering nicely in stable condition.    Attending Supervision:   Dr. Debbie Lynne MD was present for the entire procedure.    Colette Lehman MD   OB/GYN PGY-3  2025 3:45 AM

## 2025-01-18 NOTE — DISCHARGE SUMMARY
Discharge Summary - Deborah Way 29 y.o. female MRN: [id number]    Unit/Bed#: -01 Encounter: [id number]    ADMISSION  Admission Date: 2025   Admitting Attending: Dr. Debbie Lynne MD  Admitting Diagnoses:   Patient Active Problem List   Diagnosis    Short interval between pregnancies affecting pregnancy, antepartum    Supervision of low-risk pregnancy, third trimester    36 weeks gestation of pregnancy     (spontaneous vaginal delivery)    Oligohydramnios       DELIVERY  Delivery Method: Vaginal, Spontaneous   Delivery Date and Time: 2025 3:35 AM  Delivery Attending: Debbie Lynne    DISCHARGE  Discharge Date: 25  Discharge Attending: Dr.Ardite Archibald  Discharge Diagnosis:   Same, Delivered    Clinical course: Admission to Delivery  Deborah Way is a 29 y.o.  female at 36w2d who was admitted to L&D for oligohydramnios. Her labor course was notable for induction with cytotec followed by epidural for pain control. She progressed to complete at 0325, pushed once, and delivered a healthy  at 0335.       Delivery  Route of Delivery: Vaginal, Spontaneous      Anesthesia: Epidural,   QBL: Non-Surgical QBL (mL): 47      QBL:        Delivery: Vaginal, Spontaneous at 2025 3:35 AM  Laceration: Perineal: None      Baby's Weight: 2390 g (5 lb 4.3 oz); 84.3    Apgar scores: 9  and 9  at 1 and 5 minutes, respectively      Clinical Course: Post-Delivery:  The post delivery course was unremarkable.    On the day of discharge, the patient was ambulating, voiding spontaneously, tolerating oral intake, and hemodynamically stable. She was able to reasonably perform all ADLs. She had appropriate bowel function. Pain was well-controlled. She was discharged home on postpartum/postop day #2  without complications. Patient was instructed to follow up with her OB as an outpatient and was given appropriate warnings to call her provider with problems or concerns.    Pertinent lab findings  included:   Blood type A positive    Last three Hgb values:  Lab Results   Component Value Date    HGB 13.3 2025    HGB 11.8 2024    HGB 13.3 2024        Problem-specific follow-up plans included the following:  Problem List       Short interval between pregnancies affecting pregnancy, antepartum    Overview   About 6 months between pregnancy and conception          Supervision of low-risk pregnancy, third trimester    Overview   NIPT low risk. msAFP not done  Declines flu vaccine  S/p tdap vaccine   Got RSV vaccine in  pregnancy   Delivery consent signed          36 weeks gestation of pregnancy    * (Principal)  (spontaneous vaginal delivery)    Oligohydramnios        Discharge med list:  Contraception: No plans yet     Medication List      ASK your doctor about these medications     calcium citrate-vitamin D 315 mg-5 mcg tablet   PRENATAL 1 PO       Condition at discharge:   stable     Disposition:   Home    Planned Readmission:   No

## 2025-01-18 NOTE — OB LABOR/OXYTOCIN SAFETY PROGRESS
Labor Progress Note - Deborah Way 29 y.o. female MRN: [id number]    Unit/Bed#: -01 Encounter: [id number]       Contraction Frequency (minutes): 2-4  Contraction Intensity: Moderate  Uterine Activity Characteristics: Regular  Cervical Dilation: 10  Dilation Complete Date: 01/18/25  Dilation Complete Time: 0325  Cervical Effacement: 100  Fetal Station: 2  Baseline Rate (FHR): 135 bpm  Fetal Heart Rate (FHT): 120 BPM  FHR Category: 2               Vital Signs:   Vitals:    01/18/25 0250   BP: (!) 94/45   Pulse: 65   Temp:        Notes/comments:   Intermittent variable decelerations with moderate variability. Begin pushing.      Colette Lehman MD 1/18/2025 3:25 AM

## 2025-01-18 NOTE — OB LABOR/OXYTOCIN SAFETY PROGRESS
Labor Progress Note - Deborah Way 29 y.o. female MRN: [id number]    Unit/Bed#: -01 Encounter: [id number]       Contraction Frequency (minutes): 2-5  Contraction Intensity: Mild  Uterine Activity Characteristics: Irregular  Cervical Dilation: 2        Cervical Effacement: 50  Fetal Station: -3  Baseline Rate (FHR): 130 bpm  Fetal Heart Rate (FHT): 120 BPM  FHR Category: 1               Vital Signs:   Vitals:    01/17/25 2243   BP: 112/60   Pulse: 66       Notes/comments:   Patient amenable to cytotec as her cervix is unchanged on initial SVE      Colette Lehman MD 1/17/2025 11:21 PM

## 2025-01-18 NOTE — OB LABOR/OXYTOCIN SAFETY PROGRESS
Labor Progress Note - Deborah Way 29 y.o. female MRN: [id number]    Unit/Bed#: -01 Encounter: [id number]       Contraction Frequency (minutes): 2-5  Contraction Intensity: Moderate  Uterine Activity Characteristics: Regular  Cervical Dilation: 3-4        Cervical Effacement: 90  Fetal Station: -1  Baseline Rate (FHR): 130 bpm  Fetal Heart Rate (FHT): 120 BPM  FHR Category: patient not on monitor               Vital Signs:   Vitals:    01/18/25 0042   BP: 112/61   Pulse: 57       Notes/comments:   Pt complains of worsening contraction pain  Progress as noted  Will monitor baby to evaluate contraction frequency and FHR due to increased patient symptoms  Continue current management.      Debbie Lynne MD 1/18/2025 1:26 AM

## 2025-01-18 NOTE — ANESTHESIA POSTPROCEDURE EVALUATION
Post-Op Assessment Note    CV Status:  Stable  Pain Score: 1    Pain management: adequate       Mental Status:  Alert and awake   Hydration Status:  Euvolemic   PONV Controlled:  Controlled   Airway Patency:  Patent     Post Op Vitals Reviewed: Yes    No anethesia notable event occurred.    Staff: Anesthesiologist           Last Filed PACU Vitals:  Vitals Value Taken Time   Temp     Pulse 65 01/18/25 0549   /58 01/18/25 0549   Resp     SpO2

## 2025-01-18 NOTE — ANESTHESIA PROCEDURE NOTES
Epidural Block    Patient location during procedure: OB/L&D  Start time: 1/18/2025 1:45 AM  Reason for block: at surgeon's request  Staffing  Performed by: Stephan Murdock DO  Authorized by: Stephan Murdock DO    Preanesthetic Checklist  Completed: patient identified, IV checked, site marked, risks and benefits discussed, surgical consent, monitors and equipment checked, pre-op evaluation and timeout performed  Epidural  Patient position: sitting  Prep: Betadine  Sedation Level: no sedation  Patient monitoring: frequent blood pressure checks, heart rate and continuous pulse oximetry  Approach: midline  Location: lumbar, L3-4  Injection technique: JOSE air  Needle  Needle type: Tuohy   Needle gauge: 18 G  Catheter type: side hole  Catheter size: 20 G  Catheter securement method: tape  Test dose: negativeropivacaine (NAROPIN) 0.2% injection 10 mL - Epidural   10 mL - 1/18/2025 1:49:00 AM  Assessment  Sensory level: T10  Number of attempts: 1negative aspiration for CSF, negative aspiration for heme and no paresthesia on injection  patient tolerated the procedure well with no immediate complications

## 2025-01-18 NOTE — ANESTHESIA PREPROCEDURE EVALUATION
Procedure:  LABOR ANALGESIA    Relevant Problems   GYN   (+) 36 weeks gestation of pregnancy   (+) Supervision of low-risk pregnancy, third trimester      Obstetrics/Gynecology   (+) Oligohydramnios        Physical Exam    Airway    Mallampati score: II         Dental       Cardiovascular  Rhythm: regular, Rate: normal    Pulmonary   Breath sounds clear to auscultation    Other Findings  post-pubertal.      Anesthesia Plan  ASA Score- 2     Anesthesia Type- epidural with ASA Monitors.         Additional Monitors:     Airway Plan:            Plan Factors-Exercise tolerance (METS): >4 METS.    Chart reviewed.   Existing labs reviewed. Patient summary reviewed.    Patient is not a current smoker.              Induction- intravenous.    Postoperative Plan-     Perioperative Resuscitation Plan - Level 1 - Full Code.       Informed Consent- Anesthetic plan and risks discussed with patient.        NPO Status:  No vitals data found for the desired time range.

## 2025-01-18 NOTE — OB LABOR/OXYTOCIN SAFETY PROGRESS
Labor Progress Note - Deborah Way 29 y.o. female MRN: [id number]    Unit/Bed#: -01 Encounter: [id number]       Contraction Frequency (minutes): 2-6  Contraction Intensity: Mild  Uterine Activity Characteristics: Irregular  Cervical Dilation: 2        Cervical Effacement: 50  Fetal Station: -3  Baseline Rate (FHR): 130 bpm  Fetal Heart Rate (FHT): 130 BPM  FHR Category: 1               Vital Signs:   Vitals:    01/17/25 1657   BP: 129/69   Pulse: 73       Notes/comments:   Patient prefers 2 hour recheck before initiating induction. Fetal tracing is category 1, may continue with expectant management      Colette Lehman MD 1/17/2025 8:31 PM

## 2025-01-18 NOTE — PLAN OF CARE
Problem: BIRTH - VAGINAL/ SECTION  Goal: Fetal and maternal status remain reassuring during the birth process  Description: INTERVENTIONS:  - Monitor vital signs  - Monitor fetal heart rate  - Monitor uterine activity  - Monitor labor progression (vaginal delivery)  - DVT prophylaxis  - Antibiotic prophylaxis  Outcome: Progressing  Goal: Emotionally satisfying birthing experience for mother/fetus  Description: Interventions:  - Assess, plan, implement and evaluate the nursing care given to the patient in labor  - Advocate the philosophy that each childbirth experience is a unique experience and support the family's chosen level of involvement and control during the labor process   - Actively participate in both the patient's and family's teaching of the birth process  - Consider cultural, Congregation and age-specific factors and plan care for the patient in labor  Outcome: Progressing     Problem: Knowledge Deficit  Goal: Verbalizes understanding of labor plan  Description: Assess patient/family/caregiver's baseline knowledge level and ability to understand information.  Provide education via patient/family/caregiver's preferred learning method at appropriate level of understanding.     1. Provide teaching at level of understanding.  2. Provide teaching via preferred learning method(s).  Outcome: Progressing     Problem: Labor & Delivery  Goal: Manages discomfort  Description: Assess and monitor for signs and symptoms of discomfort.  Assess patient's pain level regularly and per hospital policy.  Administer medications as ordered. Support use of nonpharmacological methods to help control pain such as distraction, imagery, relaxation, and application of heat and cold.  Collaborate with interdisciplinary team and patient to determine appropriate pain management plan.    1. Include patient in decisions related to comfort.  2. Offer non-pharmacological pain management interventions.  3. Report ineffective pain  management to physician.  Outcome: Progressing  Goal: Patient vital signs are stable  Description: 1. Assess vital signs - vaginal delivery.  Outcome: Progressing

## 2025-01-19 VITALS
OXYGEN SATURATION: 97 % | SYSTOLIC BLOOD PRESSURE: 112 MMHG | HEART RATE: 61 BPM | RESPIRATION RATE: 16 BRPM | HEIGHT: 64 IN | WEIGHT: 147 LBS | DIASTOLIC BLOOD PRESSURE: 77 MMHG | TEMPERATURE: 97.7 F | BODY MASS INDEX: 25.1 KG/M2

## 2025-01-19 PROCEDURE — NC001 PR NO CHARGE: Performed by: OBSTETRICS & GYNECOLOGY

## 2025-01-19 PROCEDURE — 99024 POSTOP FOLLOW-UP VISIT: CPT | Performed by: OBSTETRICS & GYNECOLOGY

## 2025-01-19 RX ORDER — DOCUSATE SODIUM 100 MG/1
100 CAPSULE, LIQUID FILLED ORAL 2 TIMES DAILY
Qty: 60 CAPSULE | Refills: 0 | Status: SHIPPED | OUTPATIENT
Start: 2025-01-19

## 2025-01-19 RX ORDER — IBUPROFEN 600 MG/1
600 TABLET, FILM COATED ORAL EVERY 6 HOURS
Qty: 30 TABLET | Refills: 0 | Status: SHIPPED | OUTPATIENT
Start: 2025-01-19

## 2025-01-19 RX ORDER — ACETAMINOPHEN 325 MG/1
650 TABLET ORAL EVERY 4 HOURS PRN
Qty: 15 TABLET | Refills: 0 | Status: SHIPPED | OUTPATIENT
Start: 2025-01-19 | End: 2025-02-03

## 2025-01-19 RX ORDER — CALCIUM CARBONATE 500 MG/1
1000 TABLET, CHEWABLE ORAL DAILY PRN
Qty: 28 TABLET | Refills: 0 | Status: SHIPPED | OUTPATIENT
Start: 2025-01-19 | End: 2025-02-02

## 2025-01-19 RX ORDER — BENZOCAINE/MENTHOL 6 MG-10 MG
1 LOZENGE MUCOUS MEMBRANE DAILY PRN
Qty: 144 G | Refills: 0 | Status: SHIPPED | OUTPATIENT
Start: 2025-01-19

## 2025-01-19 RX ADMIN — IBUPROFEN 600 MG: 600 TABLET, FILM COATED ORAL at 00:12

## 2025-01-19 RX ADMIN — DOCUSATE SODIUM 100 MG: 100 CAPSULE, LIQUID FILLED ORAL at 09:34

## 2025-01-19 RX ADMIN — ACETAMINOPHEN 650 MG: 325 TABLET, FILM COATED ORAL at 09:34

## 2025-01-19 RX ADMIN — IBUPROFEN 600 MG: 600 TABLET, FILM COATED ORAL at 06:28

## 2025-01-19 NOTE — PROGRESS NOTES
Progress Note - OB/GYN   Name: Deborah Way 29 y.o. female I MRN: [id number]  Unit/Bed#: -01 I Date of Admission: 2025   Date of Service: 2025 I Hospital Day: 2     Assessment & Plan   (spontaneous vaginal delivery)  Routine postpartum care  Encourage ambulation  Encourage familial bonding  Lactation support as needed  Pain: Motrin/Tylenol around the clock, oxycodone if needed      Short interval between pregnancies affecting pregnancy, antepartum    36 weeks gestation of pregnancy    Oligohydramnios      OB L&D Progress Note  Subjective   Mom doing well. Able to breast feed, urinate without difficulty, did not have BM yet.     Objective :  Temp:  [97.6 °F (36.4 °C)-98.2 °F (36.8 °C)] 97.6 °F (36.4 °C)  HR:  [54-64] 54  BP: (113-127)/(70-74) 113/72  Resp:  [16-18] 18  SpO2:  [97 %-98 %] 98 %  O2 Device: None (Room air)    Physical Exam  Vitals reviewed.   HENT:      Head: Normocephalic.   Eyes:      Conjunctiva/sclera: Conjunctivae normal.   Cardiovascular:      Rate and Rhythm: Normal rate.      Pulses: Normal pulses.   Pulmonary:      Effort: Pulmonary effort is normal.   Abdominal:      General: Bowel sounds are normal.   Genitourinary:     Comments: Lochia present.   Musculoskeletal:         General: Normal range of motion.      Cervical back: Normal range of motion.   Skin:     General: Skin is warm.      Capillary Refill: Capillary refill takes less than 2 seconds.   Neurological:      Mental Status: She is alert. Mental status is at baseline.   Psychiatric:         Mood and Affect: Mood normal.           Lab Results: I have reviewed the following results:CBC/BMP: No new results in last 24 hours.

## 2025-01-19 NOTE — PROGRESS NOTES
Discharge paperwork reviewed and signed with patient and FOB. Verbalized understanding with no further questions at this time.

## 2025-01-19 NOTE — PLAN OF CARE
Problem: POSTPARTUM  Goal: Experiences normal postpartum course  Description: INTERVENTIONS:  - Monitor maternal vital signs  - Assess uterine involution and lochia  Outcome: Adequate for Discharge  Goal: Appropriate maternal -  bonding  Description: INTERVENTIONS:  - Identify family support  - Assess for appropriate maternal/infant bonding   -Encourage maternal/infant bonding opportunities  - Referral to  or  as needed  Outcome: Adequate for Discharge  Goal: Establishment of infant feeding pattern  Description: INTERVENTIONS:  - Assess breast/bottle feeding  - Refer to lactation as needed  Outcome: Adequate for Discharge  Goal: Incision(s), wounds(s) or drain site(s) healing without S/S of infection  Description: INTERVENTIONS  - Assess and document dressing, incision, wound bed, drain sites and surrounding tissue  - Provide patient and family education  -Outcome: Adequate for Discharge

## 2025-01-20 ENCOUNTER — RESULTS FOLLOW-UP (OUTPATIENT)
Dept: OBGYN CLINIC | Facility: CLINIC | Age: 30
End: 2025-01-20

## 2025-01-20 LAB — GP B STREP DNA SPEC QL NAA+PROBE: NEGATIVE

## 2025-01-20 NOTE — UTILIZATION REVIEW
"NOTIFICATION OF INPATIENT ADMISSION   MATERNITY/DELIVERY AUTHORIZATION REQUEST   SERVICING FACILITY:   [facility]  Parent Child [facility] - L&D, [place], NICU  [address] [place] [address]  Tax ID: [id number]  NPI: [id number] ATTENDING PROVIDER:  Attending Name and NPI#: Debbie Lynne Md [[id number]]  Address: [address] [place] [address]  Phone: [phone]     ADMISSION INFORMATION:  Place of Service: Inpatient [facility]  Place of Service Code: 21  Inpatient Admission Date/Time: 25  6:04 PM  Discharge Date/Time: 2025 10:43 AM  Admitting Diagnosis Code/Description:  Vaginal discharge in pregnancy in third trimester [O26.893, N89.8]   Mother: Deborah Way 1995 Estimated Date of Delivery: 25  Delivering clinician: Debbie Lynne   OB History          2    Para   2    Term   1       1    AB   0    Living   2         SAB   0    IAB   0    Ectopic   0    Multiple   0    Live Births   2                Name & MRN:   Information for the patient's :  Rayna, Baby Boy (Deborah) [[id number]]   [place] Delivery Information:  Sex: male  Delivered 2025 3:35 AM by Vaginal, Spontaneous; Gestational Age: 36w2d     Measurements:  Weight: 5 lb 4.3 oz (2390 g);  Height: 18\"    APGAR 1 minute 5 minutes 10 minutes   Totals: 9 9       UTILIZATION REVIEW CONTACT:  Sabrina Yu, Utilization   Network Utilization Review Department  Phone: [phone]  Fax [phone]  Email: [email or web address]  Contact for approvals/pending authorizations, clinical reviews, and discharge.   PHYSICIAN ADVISORY SERVICES:  Medical Necessity Denial & [facility] Review  Phone: [phone]  Fax: [phone]  Email: [email or web address]   DISCHARGE SUPPORT TEAM:  For Patients Discharge Needs & Updates  Phone: [phone] opt. 2 Fax: [phone]  Email: [email or web address]      "

## 2025-01-26 LAB — PLACENTA IN STORAGE: NORMAL

## 2025-02-04 ENCOUNTER — TELEPHONE (OUTPATIENT)
Dept: OBGYN CLINIC | Facility: CLINIC | Age: 30
End: 2025-02-04

## 2025-02-04 NOTE — TELEPHONE ENCOUNTER
Lvm to return call and schedule pp appt and to check in on patient since delivery/complete pp assessment

## 2025-03-06 ENCOUNTER — POSTPARTUM VISIT (OUTPATIENT)
Dept: OBGYN CLINIC | Facility: CLINIC | Age: 30
End: 2025-03-06

## 2025-03-06 VITALS
WEIGHT: 129.2 LBS | DIASTOLIC BLOOD PRESSURE: 84 MMHG | SYSTOLIC BLOOD PRESSURE: 110 MMHG | BODY MASS INDEX: 22.06 KG/M2 | HEIGHT: 64 IN

## 2025-03-06 DIAGNOSIS — Z12.4 SCREENING FOR CERVICAL CANCER: ICD-10-CM

## 2025-03-06 PROBLEM — Z3A.36 36 WEEKS GESTATION OF PREGNANCY: Status: RESOLVED | Noted: 2024-12-15 | Resolved: 2025-03-06

## 2025-03-06 PROBLEM — Z34.93 SUPERVISION OF LOW-RISK PREGNANCY, THIRD TRIMESTER: Status: RESOLVED | Noted: 2024-10-11 | Resolved: 2025-03-06

## 2025-03-06 PROCEDURE — PNV: Performed by: OBSTETRICS & GYNECOLOGY

## 2025-03-06 PROCEDURE — G0476 HPV COMBO ASSAY CA SCREEN: HCPCS | Performed by: OBSTETRICS & GYNECOLOGY

## 2025-03-06 PROCEDURE — G0145 SCR C/V CYTO,THINLAYER,RESCR: HCPCS | Performed by: OBSTETRICS & GYNECOLOGY

## 2025-03-06 NOTE — PROGRESS NOTES
Patient ID: Deborah Way is a 29 y.o. female.    Chief Complaint   Patient presents with    Routine Prenatal Visit         She presents for routine postpartum visit.   She is now a  who is 6wks s/p  on 25 - presumed PPROM at 36 weeks   Anesthesia: epidural   Perineum: no lacerations requiring repair       Postpartum course was uncomplicated, discharged home on PPD#2. Since d/c home she has had no complaints.   She denies abn bleeding, pelvic pain, breast complaints, bowel/bladder dysfunction, depression/anx.   Baby BOY is thriving and is Breastfeeding    Got first menstrual cycle, not prolonged or heavy     [place]  Depression Scale Total: 0  Plans for contraception: vasectomy planned, had a Mirena IUD (3 prior) before first pregnancy and would consider that if vasectomy doesn't happen      No prior pap on file. Remote h/o abnml         The following portions of the patient's history were reviewed and updated as appropriate: allergies, current medications, past family history, past medical history, past social history, past surgical history, and problem list.    Review of Systems   Constitutional:  Negative for appetite change, chills and fever.   Eyes:  Negative for visual disturbance.   Respiratory:  Negative for cough, chest tightness and shortness of breath.    Cardiovascular:  Negative for chest pain.   Gastrointestinal:  Negative for abdominal distention, abdominal pain, constipation, diarrhea, nausea and vomiting.   Endocrine: Negative for cold intolerance and heat intolerance.   Genitourinary:  Negative for difficulty urinating, dyspareunia, dysuria, frequency, genital sores, pelvic pain, urgency, vaginal bleeding, vaginal discharge and vaginal pain.   Musculoskeletal:  Negative for arthralgias.   Neurological:  Negative for light-headedness and headaches.   Hematological:  Does not bruise/bleed easily.   Psychiatric/Behavioral:  Negative for behavioral problems.    All  "other systems reviewed and are negative.               Objective:  /84 (BP Location: Right arm, Patient Position: Sitting, Cuff Size: Adult)   Ht 5' 4\" (1.626 m)   Wt 58.6 kg (129 lb 3.2 oz)   LMP 05/09/2024 (Exact Date)   Breastfeeding Yes   BMI 22.18 kg/m²     Physical Exam  Constitutional:       General: She is not in acute distress.     Appearance: Normal appearance. She is not ill-appearing.   Genitourinary:      Bladder and urethral meatus normal.      Right Labia: No rash, tenderness, lesions or skin changes.     Left Labia: No tenderness, lesions, skin changes or rash.     No labial fusion noted.      No inguinal adenopathy present in the right or left side.     No vaginal discharge, erythema, tenderness, bleeding or ulceration.        Right Adnexa: not tender, not full and no mass present.     Left Adnexa: not tender, not full and no mass present.     No cervical motion tenderness, discharge, friability, lesion, polyp or eversion.      Uterus is not enlarged, fixed, tender or irregular.      No uterine mass detected.     Uterus is anteverted.      Pelvic exam was performed with patient in the lithotomy position.   HENT:      Head: Normocephalic.   Cardiovascular:      Rate and Rhythm: Normal rate.      Heart sounds: Normal heart sounds.   Pulmonary:      Effort: Pulmonary effort is normal. No accessory muscle usage or respiratory distress.   Abdominal:      General: There is no distension.      Palpations: Abdomen is soft. There is no mass.      Tenderness: There is no abdominal tenderness. There is no guarding or rebound.   Musculoskeletal:         General: Normal range of motion.      Cervical back: No rigidity.   Lymphadenopathy:      Lower Body: No right inguinal adenopathy. No left inguinal adenopathy.   Neurological:      General: No focal deficit present.      Mental Status: She is alert. Mental status is at baseline.   Skin:     General: Skin is warm and dry.   Psychiatric:         Mood " and Affect: Mood normal.         Behavior: Behavior normal.   Vitals and nursing note reviewed. Exam conducted with a chaperone present.                   Postpartum Depression: Low Risk  (3/6/2025)    [place]  Depression Scale     Last EPDS Total Score: 0     Last EPDS Self Harm Result: Never         Assessment/Plan:    Problem List Items Addressed This Visit       Encounter for routine postpartum follow-up - Primary    6wks PostPartum.  Normal postpartum exam.   The patient may advance activity as tolerated and may resume sexual activity   Contraception discussed. Patient plans: possible Mirena IUD versus vasectomy - she will f/u if desired   EDPS score 0. Signs and symptoms of postpartum depression reviewed  Pap smear obtained  Will f/u for annual exam or as needed           Other Visit Diagnoses         Screening for cervical cancer        Relevant Orders    Liquid-based pap, screening

## 2025-03-06 NOTE — ASSESSMENT & PLAN NOTE
6wks PostPartum.  Normal postpartum exam.   The patient may advance activity as tolerated and may resume sexual activity   Contraception discussed. Patient plans: possible Mirena IUD versus vasectomy - she will f/u if desired   EDPS score 0. Signs and symptoms of postpartum depression reviewed  Pap smear obtained  Will f/u for annual exam or as needed

## 2025-03-07 LAB
HPV HR 12 DNA CVX QL NAA+PROBE: NEGATIVE
HPV16 DNA CVX QL NAA+PROBE: NEGATIVE
HPV18 DNA CVX QL NAA+PROBE: NEGATIVE

## 2025-03-12 ENCOUNTER — RESULTS FOLLOW-UP (OUTPATIENT)
Dept: OBGYN CLINIC | Facility: CLINIC | Age: 30
End: 2025-03-12

## 2025-03-12 LAB
LAB AP GYN PRIMARY INTERPRETATION: NORMAL
LAB AP LMP: NORMAL
Lab: NORMAL

## 2025-04-16 ENCOUNTER — TELEPHONE (OUTPATIENT)
Age: 30
End: 2025-04-16

## 2025-04-28 ENCOUNTER — PROCEDURE VISIT (OUTPATIENT)
Dept: OBGYN CLINIC | Facility: CLINIC | Age: 30
End: 2025-04-28
Payer: COMMERCIAL

## 2025-04-28 VITALS
WEIGHT: 122.4 LBS | DIASTOLIC BLOOD PRESSURE: 64 MMHG | BODY MASS INDEX: 20.9 KG/M2 | HEIGHT: 64 IN | SYSTOLIC BLOOD PRESSURE: 110 MMHG

## 2025-04-28 DIAGNOSIS — Z01.812 PRE-PROCEDURE LAB EXAM: ICD-10-CM

## 2025-04-28 DIAGNOSIS — Z30.430 ENCOUNTER FOR IUD INSERTION: Primary | ICD-10-CM

## 2025-04-28 PROBLEM — O09.899 SHORT INTERVAL BETWEEN PREGNANCIES AFFECTING PREGNANCY, ANTEPARTUM: Status: RESOLVED | Noted: 2024-09-27 | Resolved: 2025-04-28

## 2025-04-28 PROBLEM — O41.00X0 OLIGOHYDRAMNIOS: Status: RESOLVED | Noted: 2025-01-17 | Resolved: 2025-04-28

## 2025-04-28 LAB — SL AMB POCT URINE HCG: NORMAL

## 2025-04-28 PROCEDURE — 58300 INSERT INTRAUTERINE DEVICE: CPT | Performed by: OBSTETRICS & GYNECOLOGY

## 2025-04-28 PROCEDURE — 81025 URINE PREGNANCY TEST: CPT | Performed by: OBSTETRICS & GYNECOLOGY

## 2025-04-28 NOTE — ASSESSMENT & PLAN NOTE
S/p  2025. Had Mirena IUD's previously. Has been using condoms consistently, no recent unprotected intercourse. Mirena IUD inserted w/o difficulty. Will f/u in 4 weeks for string check. Precautions reviewed

## 2025-04-28 NOTE — PROGRESS NOTES
IUD Procedure    Date/Time: 2025 1:02 PM    Performed by: Cheri Gamble MD  Authorized by: Cheri Gamble MD    Verbal consent obtained?: Yes    Written consent obtained?: Yes    Risks and benefits: Risks, benefits and alternatives were discussed    Consent given by:  Patient  Patient states understanding of procedure being performed: Yes    Patient's understanding of procedure matches consent: Yes    Procedure consent matches procedure scheduled: Yes    Relevant documents present and verified: Yes    Test results available and properly labeled: Yes    Required items: Required blood products, implants, devices and special equipment available    Select procedure: IUD insertion    IUD Insertion:     Pelvic exam performed: yes      Negative urine pregnancy test: yes      Cervix cleaned and prepped: yes      Speculum placed in vagina: yes      Allis applied to cervix: yes      IUD inserted with no complications: yes      Strings trimmed: yes      Uterus sounded: yes      Uterus sound depth (cm):  7    IUD type:  Levonorgestrel 20 MCG/DAY  Post-procedure:     Patient tolerated procedure well: yes      Results for orders placed or performed in visit on 25   POCT urine HCG   Result Value Ref Range    URINE HCG neg      Encounter for IUD insertion  S/p  2025. Had Mirena IUD's previously. Has been using condoms consistently, no recent unprotected intercourse. Mirena IUD inserted w/o difficulty. Will f/u in 4 weeks for string check. Precautions reviewed

## 2025-06-17 ENCOUNTER — OFFICE VISIT (OUTPATIENT)
Dept: FAMILY MEDICINE CLINIC | Facility: CLINIC | Age: 30
End: 2025-06-17
Payer: COMMERCIAL

## 2025-06-17 VITALS
DIASTOLIC BLOOD PRESSURE: 70 MMHG | BODY MASS INDEX: 21.61 KG/M2 | SYSTOLIC BLOOD PRESSURE: 110 MMHG | HEIGHT: 64 IN | OXYGEN SATURATION: 100 % | WEIGHT: 126.6 LBS | TEMPERATURE: 97.8 F | HEART RATE: 70 BPM

## 2025-06-17 DIAGNOSIS — Z76.89 ENCOUNTER TO ESTABLISH CARE: Primary | ICD-10-CM

## 2025-06-17 DIAGNOSIS — F41.9 ANXIETY: ICD-10-CM

## 2025-06-17 PROCEDURE — 99204 OFFICE O/P NEW MOD 45 MIN: CPT | Performed by: FAMILY MEDICINE

## 2025-06-17 RX ORDER — HYDROXYZINE HYDROCHLORIDE 10 MG/1
10 TABLET, FILM COATED ORAL EVERY 6 HOURS PRN
Qty: 30 TABLET | Refills: 1 | Status: SHIPPED | OUTPATIENT
Start: 2025-06-17

## 2025-06-18 NOTE — PROGRESS NOTES
Name: Breanna Victor      : 1995      MRN: [id number]  Encounter Provider: Autumn Rodriguez MD  Encounter Date: 2025   Encounter department: [facility]  :  Assessment & Plan  Encounter to establish care  Healthcare maintenance reviewed  Cervical cancer screening up to date   HIV and Hep C screening up to date  Follow up for annual physical        Anxiety  - We discussed starting an SSRI/SNRI however patient does not wish to start a daily medication and would prefer something that she can take as needed. At this time will prescribe hydroxyzine 10mg PRN for anxiety. We also discussed therapy services which patient is agreeable to at this time. Referrals to social work and psych services placed. I also provided patient with other places such as [facility], [facility] or [facility]  - Follow up in 4-6 weeks   Orders:    hydrOXYzine HCL (ATARAX) 10 mg tablet; Take 1 tablet (10 mg total) by mouth every 6 (six) hours as needed for anxiety    Ambulatory referral to Psych Services; Future    Ambulatory Referral to Social Work Care Management Program; Future           History of Present Illness   HPI    Breanna Victor is a very pleasant 30 year old female with no past medical history who presents today for an encounter to establish care. Her main concern today is regarding postpartum anxiety. She recently gave birth to her second child 5 months ago and the last few weeks has been experiencing episodes of anxiety. She reports having the post partum blues with her first pregnancy but nothing like this. She reports that she mainly experiences this when both her children are crying and the anxiety typically manifests as irritability and sometimes anger. She is also back to working full time as well although she does work remotely which also adds to the stress. Apart from that she otherwise feels well and endorses no other complaints at this time.     Review of Systems   Constitutional: Negative.   "  HENT: Negative.     Eyes: Negative.    Respiratory: Negative.     Cardiovascular: Negative.    Gastrointestinal: Negative.    Genitourinary: Negative.    Musculoskeletal: Negative.    Skin: Negative.    Neurological: Negative.    Psychiatric/Behavioral:  The patient is nervous/anxious.        Objective   /70 (BP Location: Left arm, Patient Position: Sitting, Cuff Size: Standard)   Pulse 70   Temp 97.8 °F (36.6 °C) (Temporal)   Ht 5' 4\" (1.626 m)   Wt 57.4 kg (126 lb 9.6 oz)   SpO2 100%   BMI 21.73 kg/m²      Physical Exam  Constitutional:       General: She is not in acute distress.     Appearance: She is not ill-appearing.   HENT:      Head: Normocephalic and atraumatic.     Eyes:      General:         Right eye: No discharge.         Left eye: No discharge.      Extraocular Movements: Extraocular movements intact.       Cardiovascular:      Rate and Rhythm: Normal rate.   Pulmonary:      Effort: Pulmonary effort is normal. No respiratory distress.      Breath sounds: No wheezing.     Musculoskeletal:      Right lower leg: No edema.      Left lower leg: No edema.     Neurological:      General: No focal deficit present.      Mental Status: She is alert.     Psychiatric:      Comments: Intermittently tearful         "

## 2025-06-19 ENCOUNTER — PATIENT OUTREACH (OUTPATIENT)
Dept: CASE MANAGEMENT | Facility: OTHER | Age: 30
End: 2025-06-19

## 2025-06-19 NOTE — PROGRESS NOTES
This writer is covering for Virgilio Goldstein MSW regarding patient. Chart Review completed. Breanna was referred directly by PCP. Patient was referred due to post partum anxiety and rage. Patient is agreeable to therapy at this time. She was also prescribed medications for anxiety as needed.     Outreach to number listed in the chart. Forwarded directly to voicemail. Left virgilio mckinney . Note routed regarding same.

## 2025-06-25 ENCOUNTER — PATIENT OUTREACH (OUTPATIENT)
Dept: CASE MANAGEMENT | Facility: OTHER | Age: 30
End: 2025-06-25

## 2025-06-25 NOTE — LETTER
[address] [facility] [address]  [phone]    Re: Care Management   6/25/2025       Dear Breanna,    I was asked to contact you to offer you Psychosocial support.  I have tried to contact you on two occasions however I have been unable to reach you.  Please feel free to contact me at #[phone]. Thank you.      Sincerely,         Twyla Goldstein, MSW

## 2025-06-25 NOTE — PROGRESS NOTES
OP SWCM has not received return call from patient to date.  Chart reviewed and second call placed to patient to offer support regarding counseling resources for post-partum depression and message left. Unable to Reach letter has been sent to patient via My Chart.  Will close referral and remain available to provide support.

## 2025-07-16 ENCOUNTER — OFFICE VISIT (OUTPATIENT)
Dept: FAMILY MEDICINE CLINIC | Facility: CLINIC | Age: 30
End: 2025-07-16
Payer: COMMERCIAL

## 2025-07-16 VITALS
HEART RATE: 74 BPM | OXYGEN SATURATION: 98 % | WEIGHT: 128 LBS | HEIGHT: 64 IN | TEMPERATURE: 98 F | DIASTOLIC BLOOD PRESSURE: 60 MMHG | BODY MASS INDEX: 21.85 KG/M2 | SYSTOLIC BLOOD PRESSURE: 118 MMHG

## 2025-07-16 DIAGNOSIS — Z00.00 ANNUAL PHYSICAL EXAM: Primary | ICD-10-CM

## 2025-07-16 DIAGNOSIS — F41.9 ANXIETY: ICD-10-CM

## 2025-07-16 PROCEDURE — 99395 PREV VISIT EST AGE 18-39: CPT | Performed by: FAMILY MEDICINE

## 2025-07-16 PROCEDURE — 99213 OFFICE O/P EST LOW 20 MIN: CPT | Performed by: FAMILY MEDICINE

## 2025-07-16 RX ORDER — ESCITALOPRAM OXALATE 5 MG/1
5 TABLET ORAL DAILY
Qty: 30 TABLET | Refills: 1 | Status: SHIPPED | OUTPATIENT
Start: 2025-07-16

## 2025-07-16 NOTE — PROGRESS NOTES
Adult Annual Physical  Name: Breanna Victor      : 1995      MRN: [id number]  Encounter Provider: Autumn Rodriguez MD  Encounter Date: 2025   Encounter department: [facility]    :  Assessment & Plan  Annual physical exam  Satisfactory physical exam  Cervical cancer screening up to date       Anxiety  Not controlled; discussed starting a daily medication and she is agreeable. Start trial of lexapro 5mg weekly and follow up in 4 weeks.   Orders:    escitalopram (LEXAPRO) 5 mg tablet; Take 1 tablet (5 mg total) by mouth daily        Preventive Screenings:    - Cervical cancer screening: screening up-to-date     Immunizations:  - Immunizations due: Tdap         History of Present Illness   Breanna Victor is a very pleasant 30-year-old female here today for follow-up and annual physical.  At previous office visit patient had been started on hydroxyzine to take as needed however she reports that whilst it helps her with sleep she has not really found it beneficial for all of the anxiety and angry outbursts.  She was referred to social work and has been trying to find a therapist.  Apart from that she otherwise endorses no complaints this time.  She does report family history of anxiety depression in her mother as well as bipolar disorder in her father, both of them and not on medication.    Adult Annual Physical:  Patient presents for annual physical.     Diet and Physical Activity:  - Diet/Nutrition: well balanced diet.  - Exercise: no formal exercise.    Depression Screening:  - PHQ-2 Score: 0    General Health:  - Sleep:. Sleep has improved since starting hydroxyzine  - Vision: no vision problems.  - Dental: regular dental visits.    /GYN Health:  - Follows with GYN: yes.   - Menopause: premenopausal.   - Contraception: IUD placement.      Review of Systems   Constitutional: Negative.    HENT: Negative.     Eyes: Negative.    Respiratory: Negative.     Cardiovascular: Negative.   "  Gastrointestinal: Negative.    Genitourinary: Negative.    Musculoskeletal: Negative.    Skin: Negative.    Neurological: Negative.    Psychiatric/Behavioral: Negative.           Objective   /60 (BP Location: Left arm, Patient Position: Sitting, Cuff Size: Standard)   Pulse 74   Temp 98 °F (36.7 °C) (Temporal)   Ht 5' 4\" (1.626 m)   Wt 58.1 kg (128 lb)   SpO2 98%   BMI 21.97 kg/m²     Physical Exam  Constitutional:       General: She is not in acute distress.     Appearance: She is not ill-appearing.   HENT:      Head: Normocephalic and atraumatic.      Mouth/Throat:      Mouth: Mucous membranes are moist.      Pharynx: No oropharyngeal exudate or posterior oropharyngeal erythema.     Eyes:      General:         Right eye: No discharge.         Left eye: No discharge.      Extraocular Movements: Extraocular movements intact.      Pupils: Pupils are equal, round, and reactive to light.       Cardiovascular:      Rate and Rhythm: Normal rate.      Pulses: Normal pulses.   Pulmonary:      Effort: Pulmonary effort is normal. No respiratory distress.      Breath sounds: No wheezing.   Abdominal:      General: There is no distension.      Palpations: Abdomen is soft.      Tenderness: There is no abdominal tenderness.     Musculoskeletal:      Right lower leg: No edema.      Left lower leg: No edema.     Neurological:      General: No focal deficit present.      Mental Status: She is alert.      Motor: No weakness.      Coordination: Coordination normal.      Gait: Gait normal.      Deep Tendon Reflexes: Reflexes normal.     Psychiatric:         Mood and Affect: Mood normal.         Behavior: Behavior normal.         "

## 2025-08-06 ENCOUNTER — TELEPHONE (OUTPATIENT)
Age: 30
End: 2025-08-06

## 2025-08-06 DIAGNOSIS — F41.9 ANXIETY: ICD-10-CM

## 2025-08-06 RX ORDER — ESCITALOPRAM OXALATE 5 MG/1
5 TABLET ORAL DAILY
Qty: 30 TABLET | Refills: 1 | Status: SHIPPED | OUTPATIENT
Start: 2025-08-06

## 2025-08-13 ENCOUNTER — TELEMEDICINE (OUTPATIENT)
Dept: FAMILY MEDICINE CLINIC | Facility: CLINIC | Age: 30
End: 2025-08-13
Payer: COMMERCIAL

## 2025-08-13 ENCOUNTER — TELEPHONE (OUTPATIENT)
Dept: ADMINISTRATIVE | Facility: OTHER | Age: 30
End: 2025-08-13

## 2025-08-13 PROBLEM — F41.9 ANXIETY: Status: ACTIVE | Noted: 2025-08-13
